# Patient Record
Sex: FEMALE | Race: WHITE | Employment: OTHER | URBAN - NONMETROPOLITAN AREA
[De-identification: names, ages, dates, MRNs, and addresses within clinical notes are randomized per-mention and may not be internally consistent; named-entity substitution may affect disease eponyms.]

---

## 2021-08-06 ENCOUNTER — HOSPITAL ENCOUNTER (OUTPATIENT)
Dept: PREADMISSION TESTING | Age: 76
Discharge: HOME | End: 2021-08-10
Payer: MEDICARE

## 2021-08-06 VITALS — BODY MASS INDEX: 24.45 KG/M2 | HEIGHT: 63 IN | WEIGHT: 138 LBS

## 2021-08-06 LAB
ABO/RH: NORMAL
ANTIBODY SCREEN: NORMAL
SARS-COV-2, PCR: NOT DETECTED

## 2021-08-06 PROCEDURE — 86901 BLOOD TYPING SEROLOGIC RH(D): CPT

## 2021-08-06 PROCEDURE — U0005 INFEC AGEN DETEC AMPLI PROBE: HCPCS

## 2021-08-06 PROCEDURE — U0003 INFECTIOUS AGENT DETECTION BY NUCLEIC ACID (DNA OR RNA); SEVERE ACUTE RESPIRATORY SYNDROME CORONAVIRUS 2 (SARS-COV-2) (CORONAVIRUS DISEASE [COVID-19]), AMPLIFIED PROBE TECHNIQUE, MAKING USE OF HIGH THROUGHPUT TECHNOLOGIES AS DESCRIBED BY CMS-2020-01-R: HCPCS

## 2021-08-06 PROCEDURE — 86900 BLOOD TYPING SEROLOGIC ABO: CPT

## 2021-08-06 PROCEDURE — 86850 RBC ANTIBODY SCREEN: CPT

## 2021-08-06 RX ORDER — MELOXICAM 15 MG/1
15 TABLET ORAL DAILY
COMMUNITY

## 2021-08-06 RX ORDER — CALCIUM CARBONATE 500(1250)
500 TABLET ORAL DAILY
COMMUNITY

## 2021-08-06 RX ORDER — CYANOCOBALAMIN (VITAMIN B-12) 1000 MCG
1 TABLET, EXTENDED RELEASE ORAL 2 TIMES DAILY WITH MEALS
COMMUNITY
End: 2021-08-06 | Stop reason: CLARIF

## 2021-08-06 RX ORDER — SIMVASTATIN 20 MG
20 TABLET ORAL NIGHTLY
COMMUNITY

## 2021-08-06 RX ORDER — VERAPAMIL HYDROCHLORIDE 240 MG/1
240 CAPSULE, EXTENDED RELEASE ORAL NIGHTLY
COMMUNITY

## 2021-08-06 RX ORDER — OMEGA-3S/DHA/EPA/FISH OIL/D3 300MG-1000
800 CAPSULE ORAL DAILY
COMMUNITY

## 2021-08-10 ENCOUNTER — ANESTHESIA EVENT (OUTPATIENT)
Dept: OPERATING ROOM | Age: 76
End: 2021-08-10
Payer: MEDICARE

## 2021-08-10 ENCOUNTER — ANESTHESIA (OUTPATIENT)
Dept: OPERATING ROOM | Age: 76
End: 2021-08-10
Payer: MEDICARE

## 2021-08-10 ENCOUNTER — APPOINTMENT (OUTPATIENT)
Dept: GENERAL RADIOLOGY | Age: 76
End: 2021-08-10
Attending: ORTHOPAEDIC SURGERY
Payer: MEDICARE

## 2021-08-10 ENCOUNTER — HOSPITAL ENCOUNTER (OUTPATIENT)
Age: 76
Setting detail: OBSERVATION
Discharge: HOME HEALTH | End: 2021-08-14
Attending: ORTHOPAEDIC SURGERY | Admitting: ORTHOPAEDIC SURGERY
Payer: MEDICARE

## 2021-08-10 VITALS — DIASTOLIC BLOOD PRESSURE: 41 MMHG | OXYGEN SATURATION: 100 % | SYSTOLIC BLOOD PRESSURE: 85 MMHG

## 2021-08-10 DIAGNOSIS — M16.12 PRIMARY OSTEOARTHRITIS OF LEFT HIP: Primary | ICD-10-CM

## 2021-08-10 PROCEDURE — 3700000001 HC ADD 15 MINUTES (ANESTHESIA): Performed by: ORTHOPAEDIC SURGERY

## 2021-08-10 PROCEDURE — G0378 HOSPITAL OBSERVATION PER HR: HCPCS

## 2021-08-10 PROCEDURE — 2580000003 HC RX 258: Performed by: ORTHOPAEDIC SURGERY

## 2021-08-10 PROCEDURE — 2500000003 HC RX 250 WO HCPCS: Performed by: NURSE ANESTHETIST, CERTIFIED REGISTERED

## 2021-08-10 PROCEDURE — 6370000000 HC RX 637 (ALT 250 FOR IP): Performed by: ORTHOPAEDIC SURGERY

## 2021-08-10 PROCEDURE — C1776 JOINT DEVICE (IMPLANTABLE): HCPCS | Performed by: ORTHOPAEDIC SURGERY

## 2021-08-10 PROCEDURE — 3600000005 HC SURGERY LEVEL 5 BASE: Performed by: ORTHOPAEDIC SURGERY

## 2021-08-10 PROCEDURE — 6360000002 HC RX W HCPCS: Performed by: ORTHOPAEDIC SURGERY

## 2021-08-10 PROCEDURE — 7100000000 HC PACU RECOVERY - FIRST 15 MIN: Performed by: ORTHOPAEDIC SURGERY

## 2021-08-10 PROCEDURE — 73502 X-RAY EXAM HIP UNI 2-3 VIEWS: CPT

## 2021-08-10 PROCEDURE — 3700000000 HC ANESTHESIA ATTENDED CARE: Performed by: ORTHOPAEDIC SURGERY

## 2021-08-10 PROCEDURE — 3600000015 HC SURGERY LEVEL 5 ADDTL 15MIN: Performed by: ORTHOPAEDIC SURGERY

## 2021-08-10 PROCEDURE — 6360000002 HC RX W HCPCS: Performed by: NURSE ANESTHETIST, CERTIFIED REGISTERED

## 2021-08-10 PROCEDURE — 2709999900 HC NON-CHARGEABLE SUPPLY: Performed by: ORTHOPAEDIC SURGERY

## 2021-08-10 PROCEDURE — 7100000001 HC PACU RECOVERY - ADDTL 15 MIN: Performed by: ORTHOPAEDIC SURGERY

## 2021-08-10 PROCEDURE — 2500000003 HC RX 250 WO HCPCS: Performed by: ORTHOPAEDIC SURGERY

## 2021-08-10 DEVICE — IMPLANTABLE DEVICE
Type: IMPLANTABLE DEVICE | Site: HIP | Status: FUNCTIONAL
Brand: LOGICAL G-SERIES

## 2021-08-10 DEVICE — LOGICAL XLPE NEUTRAL LINER SIZE 32/48-50MM
Type: IMPLANTABLE DEVICE | Site: HIP | Status: FUNCTIONAL
Brand: PAXEON LOGICAL

## 2021-08-10 DEVICE — IMPLANTABLE DEVICE
Type: IMPLANTABLE DEVICE | Site: HIP | Status: FUNCTIONAL
Brand: ORIGIN

## 2021-08-10 DEVICE — HEAD, FEMORAL, CERAMIC, BILOX DELTA, 32MM NEUTRAL
Type: IMPLANTABLE DEVICE | Site: HIP | Status: FUNCTIONAL
Brand: DJO SURGICAL

## 2021-08-10 RX ORDER — HYDRALAZINE HYDROCHLORIDE 20 MG/ML
5 INJECTION INTRAMUSCULAR; INTRAVENOUS EVERY 10 MIN PRN
Status: DISCONTINUED | OUTPATIENT
Start: 2021-08-10 | End: 2021-08-10 | Stop reason: HOSPADM

## 2021-08-10 RX ORDER — VERAPAMIL HYDROCHLORIDE 120 MG/1
240 CAPSULE, EXTENDED RELEASE ORAL NIGHTLY
Status: DISCONTINUED | OUTPATIENT
Start: 2021-08-10 | End: 2021-08-10 | Stop reason: SDUPTHER

## 2021-08-10 RX ORDER — MORPHINE SULFATE 4 MG/ML
2 INJECTION, SOLUTION INTRAMUSCULAR; INTRAVENOUS
Status: DISCONTINUED | OUTPATIENT
Start: 2021-08-10 | End: 2021-08-14 | Stop reason: HOSPADM

## 2021-08-10 RX ORDER — MORPHINE SULFATE 4 MG/ML
4 INJECTION, SOLUTION INTRAMUSCULAR; INTRAVENOUS
Status: DISCONTINUED | OUTPATIENT
Start: 2021-08-10 | End: 2021-08-14 | Stop reason: HOSPADM

## 2021-08-10 RX ORDER — ATORVASTATIN CALCIUM 10 MG/1
10 TABLET, FILM COATED ORAL NIGHTLY
Status: DISCONTINUED | OUTPATIENT
Start: 2021-08-10 | End: 2021-08-14 | Stop reason: HOSPADM

## 2021-08-10 RX ORDER — ONDANSETRON 2 MG/ML
INJECTION INTRAMUSCULAR; INTRAVENOUS PRN
Status: DISCONTINUED | OUTPATIENT
Start: 2021-08-10 | End: 2021-08-10 | Stop reason: SDUPTHER

## 2021-08-10 RX ORDER — PROMETHAZINE HYDROCHLORIDE 25 MG/ML
6.25 INJECTION, SOLUTION INTRAMUSCULAR; INTRAVENOUS
Status: DISCONTINUED | OUTPATIENT
Start: 2021-08-10 | End: 2021-08-10 | Stop reason: HOSPADM

## 2021-08-10 RX ORDER — CELECOXIB 100 MG/1
100 CAPSULE ORAL ONCE
Status: COMPLETED | OUTPATIENT
Start: 2021-08-10 | End: 2021-08-10

## 2021-08-10 RX ORDER — METOCLOPRAMIDE HYDROCHLORIDE 5 MG/ML
10 INJECTION INTRAMUSCULAR; INTRAVENOUS
Status: DISCONTINUED | OUTPATIENT
Start: 2021-08-10 | End: 2021-08-10 | Stop reason: HOSPADM

## 2021-08-10 RX ORDER — SODIUM CHLORIDE, SODIUM LACTATE, POTASSIUM CHLORIDE, CALCIUM CHLORIDE 600; 310; 30; 20 MG/100ML; MG/100ML; MG/100ML; MG/100ML
INJECTION, SOLUTION INTRAVENOUS CONTINUOUS
Status: DISCONTINUED | OUTPATIENT
Start: 2021-08-10 | End: 2021-08-10

## 2021-08-10 RX ORDER — ENALAPRILAT 2.5 MG/2ML
1.25 INJECTION INTRAVENOUS
Status: DISCONTINUED | OUTPATIENT
Start: 2021-08-10 | End: 2021-08-10 | Stop reason: HOSPADM

## 2021-08-10 RX ORDER — ROPIVACAINE HYDROCHLORIDE 2 MG/ML
INJECTION, SOLUTION EPIDURAL; INFILTRATION; PERINEURAL CONTINUOUS PRN
Status: COMPLETED | OUTPATIENT
Start: 2021-08-10 | End: 2021-08-10

## 2021-08-10 RX ORDER — SODIUM CHLORIDE 0.9 % (FLUSH) 0.9 %
10 SYRINGE (ML) INJECTION EVERY 12 HOURS SCHEDULED
Status: DISCONTINUED | OUTPATIENT
Start: 2021-08-10 | End: 2021-08-14 | Stop reason: HOSPADM

## 2021-08-10 RX ORDER — SENNA AND DOCUSATE SODIUM 50; 8.6 MG/1; MG/1
1 TABLET, FILM COATED ORAL 2 TIMES DAILY
Status: DISCONTINUED | OUTPATIENT
Start: 2021-08-10 | End: 2021-08-14 | Stop reason: HOSPADM

## 2021-08-10 RX ORDER — TRANEXAMIC ACID 650 1/1
1950 TABLET ORAL
Status: COMPLETED | OUTPATIENT
Start: 2021-08-10 | End: 2021-08-10

## 2021-08-10 RX ORDER — OXYCODONE HYDROCHLORIDE 5 MG/1
10 TABLET ORAL EVERY 4 HOURS PRN
Status: DISCONTINUED | OUTPATIENT
Start: 2021-08-10 | End: 2021-08-14 | Stop reason: HOSPADM

## 2021-08-10 RX ORDER — BIOTIN 10000 MCG
1 CAPSULE ORAL DAILY
Status: DISCONTINUED | OUTPATIENT
Start: 2021-08-10 | End: 2021-08-10 | Stop reason: CLARIF

## 2021-08-10 RX ORDER — HYDROMORPHONE HYDROCHLORIDE 1 MG/ML
0.5 INJECTION, SOLUTION INTRAMUSCULAR; INTRAVENOUS; SUBCUTANEOUS EVERY 5 MIN PRN
Status: DISCONTINUED | OUTPATIENT
Start: 2021-08-10 | End: 2021-08-10 | Stop reason: HOSPADM

## 2021-08-10 RX ORDER — MORPHINE SULFATE 4 MG/ML
4 INJECTION, SOLUTION INTRAMUSCULAR; INTRAVENOUS EVERY 5 MIN PRN
Status: DISCONTINUED | OUTPATIENT
Start: 2021-08-10 | End: 2021-08-10 | Stop reason: HOSPADM

## 2021-08-10 RX ORDER — PROPOFOL 10 MG/ML
INJECTION, EMULSION INTRAVENOUS PRN
Status: DISCONTINUED | OUTPATIENT
Start: 2021-08-10 | End: 2021-08-10 | Stop reason: SDUPTHER

## 2021-08-10 RX ORDER — ACETAMINOPHEN 325 MG/1
650 TABLET ORAL EVERY 6 HOURS
Status: DISCONTINUED | OUTPATIENT
Start: 2021-08-10 | End: 2021-08-14 | Stop reason: HOSPADM

## 2021-08-10 RX ORDER — LABETALOL HYDROCHLORIDE 5 MG/ML
5 INJECTION, SOLUTION INTRAVENOUS EVERY 10 MIN PRN
Status: DISCONTINUED | OUTPATIENT
Start: 2021-08-10 | End: 2021-08-10 | Stop reason: HOSPADM

## 2021-08-10 RX ORDER — DIPHENHYDRAMINE HYDROCHLORIDE 50 MG/ML
12.5 INJECTION INTRAMUSCULAR; INTRAVENOUS
Status: DISCONTINUED | OUTPATIENT
Start: 2021-08-10 | End: 2021-08-10 | Stop reason: HOSPADM

## 2021-08-10 RX ORDER — OMEGA-3S/DHA/EPA/FISH OIL/D3 300MG-1000
800 CAPSULE ORAL DAILY
Status: DISCONTINUED | OUTPATIENT
Start: 2021-08-10 | End: 2021-08-14 | Stop reason: HOSPADM

## 2021-08-10 RX ORDER — ACETAMINOPHEN 500 MG
1000 TABLET ORAL ONCE
Status: COMPLETED | OUTPATIENT
Start: 2021-08-10 | End: 2021-08-10

## 2021-08-10 RX ORDER — MELOXICAM 7.5 MG/1
15 TABLET ORAL DAILY
Status: DISCONTINUED | OUTPATIENT
Start: 2021-08-10 | End: 2021-08-14 | Stop reason: HOSPADM

## 2021-08-10 RX ORDER — DEXAMETHASONE SODIUM PHOSPHATE 10 MG/ML
INJECTION, SOLUTION INTRAMUSCULAR; INTRAVENOUS PRN
Status: DISCONTINUED | OUTPATIENT
Start: 2021-08-10 | End: 2021-08-10 | Stop reason: SDUPTHER

## 2021-08-10 RX ORDER — MORPHINE SULFATE 4 MG/ML
2 INJECTION, SOLUTION INTRAMUSCULAR; INTRAVENOUS EVERY 5 MIN PRN
Status: DISCONTINUED | OUTPATIENT
Start: 2021-08-10 | End: 2021-08-10 | Stop reason: HOSPADM

## 2021-08-10 RX ORDER — VERAPAMIL HYDROCHLORIDE 240 MG/1
240 TABLET, FILM COATED, EXTENDED RELEASE ORAL NIGHTLY
Status: DISCONTINUED | OUTPATIENT
Start: 2021-08-10 | End: 2021-08-14 | Stop reason: HOSPADM

## 2021-08-10 RX ORDER — EPHEDRINE SULFATE 50 MG/ML
INJECTION, SOLUTION INTRAVENOUS PRN
Status: DISCONTINUED | OUTPATIENT
Start: 2021-08-10 | End: 2021-08-10 | Stop reason: SDUPTHER

## 2021-08-10 RX ORDER — OXYCODONE HYDROCHLORIDE 5 MG/1
5 TABLET ORAL EVERY 4 HOURS PRN
Status: DISCONTINUED | OUTPATIENT
Start: 2021-08-10 | End: 2021-08-14 | Stop reason: HOSPADM

## 2021-08-10 RX ORDER — OXYCODONE HCL 10 MG/1
10 TABLET, FILM COATED, EXTENDED RELEASE ORAL
Status: COMPLETED | OUTPATIENT
Start: 2021-08-10 | End: 2021-08-10

## 2021-08-10 RX ORDER — SODIUM CHLORIDE 0.9 % (FLUSH) 0.9 %
10 SYRINGE (ML) INJECTION PRN
Status: DISCONTINUED | OUTPATIENT
Start: 2021-08-10 | End: 2021-08-14 | Stop reason: HOSPADM

## 2021-08-10 RX ORDER — MEPERIDINE HYDROCHLORIDE 25 MG/ML
12.5 INJECTION INTRAMUSCULAR; INTRAVENOUS; SUBCUTANEOUS EVERY 5 MIN PRN
Status: DISCONTINUED | OUTPATIENT
Start: 2021-08-10 | End: 2021-08-10 | Stop reason: HOSPADM

## 2021-08-10 RX ORDER — ASPIRIN 81 MG/1
81 TABLET ORAL DAILY
Status: ON HOLD | COMMUNITY
End: 2021-08-13 | Stop reason: HOSPADM

## 2021-08-10 RX ORDER — BUPIVACAINE HYDROCHLORIDE 7.5 MG/ML
INJECTION, SOLUTION INTRASPINAL PRN
Status: DISCONTINUED | OUTPATIENT
Start: 2021-08-10 | End: 2021-08-10 | Stop reason: SDUPTHER

## 2021-08-10 RX ORDER — HYDROMORPHONE HYDROCHLORIDE 1 MG/ML
0.25 INJECTION, SOLUTION INTRAMUSCULAR; INTRAVENOUS; SUBCUTANEOUS EVERY 5 MIN PRN
Status: DISCONTINUED | OUTPATIENT
Start: 2021-08-10 | End: 2021-08-10 | Stop reason: HOSPADM

## 2021-08-10 RX ORDER — SODIUM CHLORIDE 9 MG/ML
25 INJECTION, SOLUTION INTRAVENOUS PRN
Status: DISCONTINUED | OUTPATIENT
Start: 2021-08-10 | End: 2021-08-14 | Stop reason: HOSPADM

## 2021-08-10 RX ORDER — BIOTIN 10000 MCG
1 CAPSULE ORAL DAILY
COMMUNITY

## 2021-08-10 RX ADMIN — VERAPAMIL HYDROCHLORIDE 240 MG: 240 TABLET, FILM COATED, EXTENDED RELEASE ORAL at 21:07

## 2021-08-10 RX ADMIN — MELOXICAM 15 MG: 7.5 TABLET ORAL at 17:05

## 2021-08-10 RX ADMIN — PHENYLEPHRINE HYDROCHLORIDE 80 MCG: 10 INJECTION INTRAVENOUS at 13:50

## 2021-08-10 RX ADMIN — MORPHINE SULFATE 4 MG: 4 INJECTION, SOLUTION INTRAMUSCULAR; INTRAVENOUS at 21:08

## 2021-08-10 RX ADMIN — PROPOFOL 20 MG: 10 INJECTION, EMULSION INTRAVENOUS at 13:02

## 2021-08-10 RX ADMIN — DEXAMETHASONE SODIUM PHOSPHATE 10 MG: 10 INJECTION, SOLUTION INTRAMUSCULAR; INTRAVENOUS at 13:11

## 2021-08-10 RX ADMIN — OXYCODONE 10 MG: 5 TABLET ORAL at 19:36

## 2021-08-10 RX ADMIN — CELECOXIB 100 MG: 100 CAPSULE ORAL at 11:04

## 2021-08-10 RX ADMIN — EPHEDRINE SULFATE 10 MG: 50 INJECTION INTRAMUSCULAR; INTRAVENOUS; SUBCUTANEOUS at 13:09

## 2021-08-10 RX ADMIN — PHENYLEPHRINE HYDROCHLORIDE 80 MCG: 10 INJECTION INTRAVENOUS at 13:41

## 2021-08-10 RX ADMIN — SODIUM CHLORIDE, SODIUM LACTATE, POTASSIUM CHLORIDE, AND CALCIUM CHLORIDE: 600; 310; 30; 20 INJECTION, SOLUTION INTRAVENOUS at 13:26

## 2021-08-10 RX ADMIN — ASPIRIN 325 MG: 325 TABLET, COATED ORAL at 21:07

## 2021-08-10 RX ADMIN — CHOLECALCIFEROL TAB 10 MCG (400 UNIT) 800 UNITS: 10 TAB at 17:05

## 2021-08-10 RX ADMIN — PHENYLEPHRINE HYDROCHLORIDE 80 MCG: 10 INJECTION INTRAVENOUS at 13:31

## 2021-08-10 RX ADMIN — Medication 2000 MG: at 21:07

## 2021-08-10 RX ADMIN — PROPOFOL 20 MG: 10 INJECTION, EMULSION INTRAVENOUS at 12:50

## 2021-08-10 RX ADMIN — MORPHINE SULFATE 4 MG: 4 INJECTION, SOLUTION INTRAMUSCULAR; INTRAVENOUS at 23:59

## 2021-08-10 RX ADMIN — ATORVASTATIN CALCIUM 10 MG: 10 TABLET, FILM COATED ORAL at 21:08

## 2021-08-10 RX ADMIN — ACETAMINOPHEN 650 MG: 325 TABLET ORAL at 17:05

## 2021-08-10 RX ADMIN — PHENYLEPHRINE HYDROCHLORIDE 80 MCG: 10 INJECTION INTRAVENOUS at 14:03

## 2021-08-10 RX ADMIN — ONDANSETRON HYDROCHLORIDE 4 MG: 2 INJECTION, SOLUTION INTRAMUSCULAR; INTRAVENOUS at 14:10

## 2021-08-10 RX ADMIN — PROPOFOL 120 MCG/KG/MIN: 10 INJECTION, EMULSION INTRAVENOUS at 12:57

## 2021-08-10 RX ADMIN — Medication 2000 MG: at 13:01

## 2021-08-10 RX ADMIN — ACETAMINOPHEN 1000 MG: 500 TABLET ORAL at 11:04

## 2021-08-10 RX ADMIN — OXYCODONE HYDROCHLORIDE 10 MG: 10 TABLET, FILM COATED, EXTENDED RELEASE ORAL at 11:04

## 2021-08-10 RX ADMIN — TRANEXAMIC ACID 1950 MG: 650 TABLET ORAL at 11:04

## 2021-08-10 RX ADMIN — ACETAMINOPHEN 650 MG: 325 TABLET ORAL at 21:07

## 2021-08-10 RX ADMIN — EPHEDRINE SULFATE 10 MG: 50 INJECTION INTRAMUSCULAR; INTRAVENOUS; SUBCUTANEOUS at 13:12

## 2021-08-10 RX ADMIN — SODIUM CHLORIDE, PRESERVATIVE FREE 10 ML: 5 INJECTION INTRAVENOUS at 21:07

## 2021-08-10 RX ADMIN — BUPIVACAINE HYDROCHLORIDE IN DEXTROSE 2 ML: 7.5 INJECTION, SOLUTION SUBARACHNOID at 12:54

## 2021-08-10 RX ADMIN — SODIUM CHLORIDE, SODIUM LACTATE, POTASSIUM CHLORIDE, AND CALCIUM CHLORIDE: 600; 310; 30; 20 INJECTION, SOLUTION INTRAVENOUS at 11:07

## 2021-08-10 RX ADMIN — DOCUSATE SODIUM 50 MG AND SENNOSIDES 8.6 MG 1 TABLET: 8.6; 5 TABLET, FILM COATED ORAL at 21:07

## 2021-08-10 ASSESSMENT — PAIN SCALES - GENERAL
PAINLEVEL_OUTOF10: 4
PAINLEVEL_OUTOF10: 5
PAINLEVEL_OUTOF10: 9
PAINLEVEL_OUTOF10: 7
PAINLEVEL_OUTOF10: 0
PAINLEVEL_OUTOF10: 0
PAINLEVEL_OUTOF10: 8
PAINLEVEL_OUTOF10: 5

## 2021-08-10 ASSESSMENT — PAIN DESCRIPTION - ONSET
ONSET: ON-GOING
ONSET: ON-GOING

## 2021-08-10 ASSESSMENT — PAIN DESCRIPTION - PAIN TYPE
TYPE: SURGICAL PAIN

## 2021-08-10 ASSESSMENT — PAIN DESCRIPTION - ORIENTATION
ORIENTATION: LEFT

## 2021-08-10 ASSESSMENT — PAIN DESCRIPTION - PROGRESSION
CLINICAL_PROGRESSION: NOT CHANGED
CLINICAL_PROGRESSION: GRADUALLY WORSENING

## 2021-08-10 ASSESSMENT — PAIN DESCRIPTION - LOCATION
LOCATION: HIP

## 2021-08-10 ASSESSMENT — PAIN DESCRIPTION - DIRECTION
RADIATING_TOWARDS: NO
RADIATING_TOWARDS: NO

## 2021-08-10 ASSESSMENT — PAIN DESCRIPTION - FREQUENCY
FREQUENCY: INTERMITTENT
FREQUENCY: CONTINUOUS

## 2021-08-10 ASSESSMENT — PAIN DESCRIPTION - DESCRIPTORS
DESCRIPTORS: SHARP
DESCRIPTORS: SHARP

## 2021-08-10 ASSESSMENT — PAIN - FUNCTIONAL ASSESSMENT
PAIN_FUNCTIONAL_ASSESSMENT: PREVENTS OR INTERFERES SOME ACTIVE ACTIVITIES AND ADLS
PAIN_FUNCTIONAL_ASSESSMENT: PREVENTS OR INTERFERES SOME ACTIVE ACTIVITIES AND ADLS

## 2021-08-10 ASSESSMENT — LIFESTYLE VARIABLES: SMOKING_STATUS: 0

## 2021-08-10 NOTE — ANESTHESIA POSTPROCEDURE EVALUATION
Department of Anesthesiology  Postprocedure Note    Patient: Obdulia Bowser  MRN: [id number]  YOB: 1945  Date of evaluation: 8/10/2021  Time:  2:25 PM     Procedure Summary     Date: 08/10/21 Room / Location: [address]    Anesthesia Start: 4985 Anesthesia Stop: 6235    Procedure: LEFT TOTAL HIP ARTHROPLASTY ANTERIOR APPROACH (Left Hip) Diagnosis: (F47.22)    Surgeons: Law García MD Responsible Provider: CORY Workman CRNA    Anesthesia Type: spinal ASA Status: 2          Anesthesia Type: spinal    Coral Phase I: Coral Score: 10    Coral Phase II:      Last vitals: Reviewed and per EMR flowsheets.        Anesthesia Post Evaluation    Patient location during evaluation: PACU  Patient participation: complete - patient participated  Level of consciousness: responsive to verbal stimuli and sleepy but conscious  Pain score: 0  Airway patency: patent  Nausea & Vomiting: no vomiting and no nausea  Complications: no  Cardiovascular status: hemodynamically stable  Respiratory status: room air and acceptable  Hydration status: stable  Comments: T 98

## 2021-08-10 NOTE — PROGRESS NOTES
PHARMACY NOTE  Irvine Litten was ordered Biotin. Per the [address], this medication is non-formulary and not stocked by pharmacy. It has been discontinued. The medication can be reordered at discharge.      Electronically signed by Jen Rivas, [address] [place] [address] on 8/10/2021 at 3:41 PM

## 2021-08-10 NOTE — OP NOTE
TOTAL HIP ARTHROPLASTY OPERATIVE NOTE    NAME OF SURGEON / : Jose E Contreras MD  PATIENT:   Rad Cardenas  Date: 8/10/2021        Time: 2:07 PM   Referring Physician: ________________________    PREOP DIAGNOSIS:  left hip  Primary osteoarthritis   POSTOP DIAGNOSIS:  Same     PROCEDURE:    Left    Hip arthroplasty (88443)     IMPLANTS:   Implant Name Type Inv. Item Serial No.  Lot No. LRB No. Used Action   SHELL ACET 3 HOLE 48-B HIP LOGICAL G-SERIES  SHELL ACET 3 HOLE 48-B HIP LOGICAL G-SERIES  [employer] [id number] Left 1 Implanted   STEM FEM SZ 11 OFFSET MOD STD CLLRD ORIGIN  STEM FEM SZ 11 OFFSET MOD STD CLLRD ORIGIN  [employer] [id number] Left 1 Implanted   LINER ACET NEUT 32X48-50 MM HIP XLPE LOGICAL  LINER ACET NEUT 32X48-50 MM HIP XLPE LOGICAL  [employer]WD [id number] Left 1 Implanted   HEAD FEM NEUT 32 MM HIP OFFSET FOR FMP SYS BIOLOX DELT CERM  HEAD FEM NEUT 32 MM HIP OFFSET FOR FMP SYS BIOLOX DELT [facility] Files [id number] Left 1 Implanted       FINDINGS: None  ASSISTANT:  Meryle Kuba, certified first assistant. Helped with draping, exposure, retraction, essential steps of the procedure, and with wound closure. ANESTHESIA:  General  EBL:  500 mL  FLUIDS: See anesthesia record  BLOOD PRODUCTS:  None  COMPLICATIONS:  Small medial calcar fracture during broaching. Nondisplaced. Chose not to use cable since final stem stable with a collar. Will toe touch wb x 3 weeks. SPECIMEN:  None        INDICATIONS:  Patient presents for the above procedure having failed conservative treatment. Patient consents to the procedure above understanding the risks of bleeding, infection, anesthesia, nerve injury, stiffness, and blood clots. Procedure in Detail:    The patient was brought into the operating room, general anesthesia given, and transferred to the HANA table.   The operative extremity was placed in light traction across a padded perineal post. An antibiotic was given IV. Parmjit Ra The extremity was prepped with chlorhexidine and alcohol and draped sterilely. Ioband barriers were used. An anterior approach was made to the hip. Careful dissection was carried down to fascia which was longitudinally incised. The tensor muscle was elevated off the medial fascia and a cobra retractor placed around the lateral femoral neck. Aurelia retractors were used distally between the Sartorious and tensor to expose the vastus aponeurosis. This was released with the bovie to expose the lateral circumflex vessels. These were coagulated and divided with the bovie. A herman elevator was used to lift the rectus off the capsule and a cobra retractor placed around the medial femoral neck. A bent roseanna retractor was placed over the superior acetabulum. Traction was applied and the capsule was incised by beginning at the superior acetabulum and extending distally to the intertrochanteric line and then dividing medially and laterally. The capsular flaps were marked with 0 vicryl sutures. The cobra retractors were placed deep to capsule around the neck. The neck was cut with the saw beginning laterally at the neck-trochanter junction and completed medially 5 mm proximal to the intertrochanteric line. A second saw cut was made parallel to the first 10 mm proximal to the first cut. The ring of neck bone was removed with a kocher clamp and the head removed with a corkscrew. Traction was released and the leg was externally rotated 120 degrees. A roseanna retractor was placed over the lesser trochanter and the medial hip capsule was released of the inferior femoral neck with a bovie and herman elevator. Gentle traction was applied and cobra retractors were placed around the acetabulum. The inferior capsule was released and the labrum and foveal were excised.   The socket was reamed with a reamer 2 sizes smaller than the head diameter and continued with increasing reamer size running 3-0 vicryl and prineo. A sterile dressing was placed. The patient was awakened, extubated and transferred to recovery in stable condition. ITERATIONS   Neck Length (mm) Offset: Other Stable Ant?  Leg Length difference  (mm) Stable Post?                                                           Electronically signed by Colin Wright MD on 8/10/2021 at 2:07 PM

## 2021-08-10 NOTE — ANESTHESIA PRE PROCEDURE
Department of Anesthesiology  Preprocedure Note       Name:  Sil Robles   Age:  77 y.o.  :  1945                                          MRN:  [id number]         Date:  8/10/2021      Surgeon: Patrick Forde):  Jennifer Ruelas MD    Procedure: Procedure(s):  LEFT TOTAL HIP ARTHROPLASTY ANTERIOR APPROACH    Medications prior to admission:   Prior to Admission medications    Medication Sig Start Date End Date Taking?  Authorizing Provider   aspirin 81 MG EC tablet Take 81 mg by mouth daily   Yes Historical Provider, MD   Biotin 10 MG CAPS Take 1 tablet by mouth daily   Yes Historical Provider, MD   estrogens, conjugated, (PREMARIN) 0.3 MG tablet Take 0.3 mg by mouth daily   Yes Historical Provider, MD   verapamil (VERELAN) 240 MG extended release capsule Take 240 mg by mouth nightly   Yes Historical Provider, MD   simvastatin (ZOCOR) 20 MG tablet Take 20 mg by mouth nightly   Yes Historical Provider, MD   calcium carbonate (OSCAL) 500 MG TABS tablet Take 500 mg by mouth daily   Yes Historical Provider, MD   vitamin D3 (CHOLECALCIFEROL) 10 MCG (400 UNIT) TABS tablet Take 800 Units by mouth daily   Yes Historical Provider, MD   Pyridoxine HCl (VITAMIN B6 PO) Take 10 mg by mouth daily   Yes Historical Provider, MD   Magnesium Hydroxide (MAGNESIA PO) Take 80 mg by mouth daily   Yes Historical Provider, MD   BORON PO Take 1 mg by mouth daily   Yes Historical Provider, MD   meloxicam (MOBIC) 15 MG tablet Take 15 mg by mouth daily   Yes Historical Provider, MD       Current medications:    Current Facility-Administered Medications   Medication Dose Route Frequency Provider Last Rate Last Admin    oxyCODONE (OXYCONTIN) extended release tablet 10 mg  10 mg Oral 60 Min Pre-Op Jennifer Ruelas MD        acetaminophen (TYLENOL) tablet 1,000 mg  1,000 mg Oral Once Jennifer Ruelas MD        celecoxib (CELEBREX) capsule 100 mg  100 mg Oral Once Jennifer Ruelas MD        tranexamic acid (LYSTEDA) tablet 1,950 mg  1,950 mg Oral On Call to OR Pablo Ramos MD        lactated ringers infusion   Intravenous Continuous Pablo Ramos MD        ceFAZolin (ANCEF) 2000 mg in 0.9% sodium chloride 50 mL IVPB  2,000 mg Intravenous On Call to [address], MD           Allergies:  No Known Allergies    Problem List:  There is no problem list on file for this patient. Past Medical History:        Diagnosis Date    Arthritis     Hyperlipidemia     Hypertension        Past Surgical History:        Procedure Laterality Date    APPENDECTOMY      HYSTERECTOMY         Social History:    Social History     Tobacco Use    Smoking status: Never Smoker    Smokeless tobacco: Never Used   Substance Use Topics    Alcohol use: Yes     Comment: occasional                                Counseling given: Not Answered      Vital Signs (Current):   Vitals:    08/10/21 1049   BP: (!) 147/61   Pulse: 79   Resp: 16   Temp: 98.2 °F (36.8 °C)   TempSrc: Temporal   SpO2: 96%                                              BP Readings from Last 3 Encounters:   08/10/21 (!) 147/61       NPO Status: Time of last liquid consumption: 2300                        Time of last solid consumption: 2300                        Date of last liquid consumption: 08/09/21                        Date of last solid food consumption: 08/09/21    BMI:   Wt Readings from Last 3 Encounters:   08/06/21 138 lb (62.6 kg)     There is no height or weight on file to calculate BMI.    CBC: No results found for: WBC, RBC, HGB, HCT, MCV, RDW, PLT    CMP: No results found for: NA, K, CL, CO2, BUN, CREATININE, GFRAA, AGRATIO, LABGLOM, GLUCOSE, PROT, CALCIUM, BILITOT, ALKPHOS, AST, ALT    POC Tests: No results for input(s): POCGLU, POCNA, POCK, POCCL, POCBUN, POCHEMO, POCHCT in the last 72 hours.     Coags: No results found for: PROTIME, INR, APTT    HCG (If Applicable): No results found for: PREGTESTUR, PREGSERUM, HCG, HCGQUANT     ABGs: No results found for: PHART, PO2ART, TJL0NWX, ZPM2HFP, BEART, P7UVAOAK     Type & Screen (If Applicable):  No results found for: LABABO, LABRH    Drug/Infectious Status (If Applicable):  No results found for: HIV, HEPCAB    COVID-19 Screening (If Applicable):   Lab Results   Component Value Date    COVID19 Not Detected 08/06/2021           Anesthesia Evaluation  Patient summary reviewed and Nursing notes reviewed no history of anesthetic complications:   Airway: Mallampati: II  TM distance: >3 FB   Neck ROM: full  Mouth opening: > = 3 FB Dental: normal exam         Pulmonary:normal exam        (-) not a current smoker                           Cardiovascular:    (+) hypertension:, hyperlipidemia      ECG reviewed               Beta Blocker:  Not on Beta Blocker         Neuro/Psych:      (-) seizures and CVA           GI/Hepatic/Renal:        (-) GERD, liver disease and no renal disease       Endo/Other:        (-) diabetes mellitus               Abdominal:             Vascular: negative vascular ROS. Other Findings:             Anesthesia Plan      spinal     ASA 2     (Pt is hoping to go home)      MIPS: Postoperative opioids intended and Prophylactic antiemetics administered. Anesthetic plan and risks discussed with patient and spouse. Plan discussed with CRNA.                 Marilou Ware MD   8/10/2021

## 2021-08-11 LAB
ANION GAP SERPL CALCULATED.3IONS-SCNC: 8 MMOL/L (ref 7–19)
BUN BLDV-MCNC: 12 MG/DL (ref 8–23)
CALCIUM SERPL-MCNC: 8 MG/DL (ref 8.8–10.2)
CHLORIDE BLD-SCNC: 101 MMOL/L (ref 98–111)
CO2: 28 MMOL/L (ref 22–29)
CREAT SERPL-MCNC: 0.7 MG/DL (ref 0.5–0.9)
GFR AFRICAN AMERICAN: >59
GFR NON-AFRICAN AMERICAN: >60
GLUCOSE BLD-MCNC: 128 MG/DL (ref 74–109)
HCT VFR BLD CALC: 26.3 % (ref 37–47)
HEMOGLOBIN: 8.6 G/DL (ref 12–16)
POTASSIUM REFLEX MAGNESIUM: 3.6 MMOL/L (ref 3.5–5)
SODIUM BLD-SCNC: 137 MMOL/L (ref 136–145)

## 2021-08-11 PROCEDURE — G0378 HOSPITAL OBSERVATION PER HR: HCPCS

## 2021-08-11 PROCEDURE — 97530 THERAPEUTIC ACTIVITIES: CPT

## 2021-08-11 PROCEDURE — 6360000002 HC RX W HCPCS: Performed by: ORTHOPAEDIC SURGERY

## 2021-08-11 PROCEDURE — 2580000003 HC RX 258: Performed by: ORTHOPAEDIC SURGERY

## 2021-08-11 PROCEDURE — 36415 COLL VENOUS BLD VENIPUNCTURE: CPT

## 2021-08-11 PROCEDURE — 51798 US URINE CAPACITY MEASURE: CPT

## 2021-08-11 PROCEDURE — 85018 HEMOGLOBIN: CPT

## 2021-08-11 PROCEDURE — 97165 OT EVAL LOW COMPLEX 30 MIN: CPT

## 2021-08-11 PROCEDURE — 80048 BASIC METABOLIC PNL TOTAL CA: CPT

## 2021-08-11 PROCEDURE — 97161 PT EVAL LOW COMPLEX 20 MIN: CPT

## 2021-08-11 PROCEDURE — 6370000000 HC RX 637 (ALT 250 FOR IP): Performed by: ORTHOPAEDIC SURGERY

## 2021-08-11 PROCEDURE — 85014 HEMATOCRIT: CPT

## 2021-08-11 RX ADMIN — OXYCODONE 10 MG: 5 TABLET ORAL at 03:30

## 2021-08-11 RX ADMIN — ACETAMINOPHEN 650 MG: 325 TABLET ORAL at 17:56

## 2021-08-11 RX ADMIN — ACETAMINOPHEN 650 MG: 325 TABLET ORAL at 08:38

## 2021-08-11 RX ADMIN — DOCUSATE SODIUM 50 MG AND SENNOSIDES 8.6 MG 1 TABLET: 8.6; 5 TABLET, FILM COATED ORAL at 21:06

## 2021-08-11 RX ADMIN — ASPIRIN 325 MG: 325 TABLET, COATED ORAL at 21:06

## 2021-08-11 RX ADMIN — ATORVASTATIN CALCIUM 10 MG: 10 TABLET, FILM COATED ORAL at 21:06

## 2021-08-11 RX ADMIN — CHOLECALCIFEROL TAB 10 MCG (400 UNIT) 800 UNITS: 10 TAB at 08:37

## 2021-08-11 RX ADMIN — OXYCODONE 10 MG: 5 TABLET ORAL at 18:25

## 2021-08-11 RX ADMIN — Medication 2000 MG: at 03:30

## 2021-08-11 RX ADMIN — VERAPAMIL HYDROCHLORIDE 240 MG: 240 TABLET, FILM COATED, EXTENDED RELEASE ORAL at 21:06

## 2021-08-11 RX ADMIN — OXYCODONE 10 MG: 5 TABLET ORAL at 07:48

## 2021-08-11 RX ADMIN — ACETAMINOPHEN 650 MG: 325 TABLET ORAL at 21:06

## 2021-08-11 RX ADMIN — DOCUSATE SODIUM 50 MG AND SENNOSIDES 8.6 MG 1 TABLET: 8.6; 5 TABLET, FILM COATED ORAL at 08:38

## 2021-08-11 RX ADMIN — ASPIRIN 325 MG: 325 TABLET, COATED ORAL at 08:38

## 2021-08-11 RX ADMIN — MELOXICAM 15 MG: 7.5 TABLET ORAL at 08:38

## 2021-08-11 RX ADMIN — ACETAMINOPHEN 650 MG: 325 TABLET ORAL at 03:30

## 2021-08-11 RX ADMIN — SODIUM CHLORIDE, PRESERVATIVE FREE 10 ML: 5 INJECTION INTRAVENOUS at 21:00

## 2021-08-11 ASSESSMENT — PAIN DESCRIPTION - LOCATION
LOCATION: HIP

## 2021-08-11 ASSESSMENT — PAIN DESCRIPTION - PAIN TYPE
TYPE: SURGICAL PAIN

## 2021-08-11 ASSESSMENT — PAIN DESCRIPTION - ONSET
ONSET: ON-GOING
ONSET: ON-GOING

## 2021-08-11 ASSESSMENT — PAIN SCALES - GENERAL
PAINLEVEL_OUTOF10: 3
PAINLEVEL_OUTOF10: 7
PAINLEVEL_OUTOF10: 6
PAINLEVEL_OUTOF10: 5
PAINLEVEL_OUTOF10: 6
PAINLEVEL_OUTOF10: 3
PAINLEVEL_OUTOF10: 4
PAINLEVEL_OUTOF10: 4

## 2021-08-11 ASSESSMENT — PAIN - FUNCTIONAL ASSESSMENT
PAIN_FUNCTIONAL_ASSESSMENT: PREVENTS OR INTERFERES SOME ACTIVE ACTIVITIES AND ADLS

## 2021-08-11 ASSESSMENT — PAIN DESCRIPTION - ORIENTATION
ORIENTATION: LEFT

## 2021-08-11 ASSESSMENT — PAIN DESCRIPTION - PROGRESSION
CLINICAL_PROGRESSION: NOT CHANGED

## 2021-08-11 ASSESSMENT — PAIN DESCRIPTION - DESCRIPTORS
DESCRIPTORS: DISCOMFORT;SORE
DESCRIPTORS: DISCOMFORT;SORE
DESCRIPTORS: SHARP

## 2021-08-11 ASSESSMENT — PAIN DESCRIPTION - FREQUENCY
FREQUENCY: INTERMITTENT

## 2021-08-11 NOTE — CARE COORDINATION
SW went over choice list with Pt of facilities in the area. Pt has requested to be listed with [facility] (formerly [facility]  SW has notified the facility of the referral. Awaiting approval/denial.  [facility]   T   F  Electronically signed by Tim Simms on 8/11/2021 at 3:20 PM

## 2021-08-11 NOTE — PROGRESS NOTES
Subjective:     Post-Operative Day: 1 No complaints. A lot of pain. We have stairs at home    Objective:     Patient Vitals for the past 24 hrs:   BP Temp Temp src Pulse Resp SpO2 Height Weight   08/11/21 0354 (!) 135/55 97.9 °F (36.6 °C) Temporal 110 16 90 % -- --   08/10/21 2358 (!) 148/58 98.1 °F (36.7 °C) Temporal 101 19 97 % -- --   08/10/21 1912 (!) 118/50 96.9 °F (36.1 °C) Temporal 74 16 96 % -- --   08/10/21 1610 (!) 131/58 97.4 °F (36.3 °C) Temporal 67 14 97 % -- --   08/10/21 1541 (!) 125/51 96.6 °F (35.9 °C) Temporal 66 16 96 % 5' 3\" (1.6 m) 138 lb (62.6 kg)   08/10/21 1537 -- -- -- 73 -- -- -- --   08/10/21 1522 (!) 114/53 -- -- 59 16 94 % -- --   08/10/21 1517 (!) 118/50 -- -- 60 16 92 % -- --   08/10/21 1515 -- -- -- 58 -- -- -- --   08/10/21 1512 (!) 119/53 -- -- 60 15 90 % -- --   08/10/21 1507 (!) 122/54 98.5 °F (36.9 °C) -- 59 13 91 % -- --   08/10/21 1502 (!) 122/54 -- -- 60 12 92 % -- --   08/10/21 1500 -- -- -- 59 -- -- -- --   08/10/21 1457 (!) 120/53 -- -- 61 12 91 % -- --   08/10/21 1452 (!) 113/55 -- -- 60 12 92 % -- --   08/10/21 1447 (!) 119/53 -- -- 63 10 91 % -- --   08/10/21 1445 -- -- -- 63 -- -- -- --   08/10/21 1442 (!) 125/52 -- -- 61 16 92 % -- --   08/10/21 1437 (!) 117/55 -- -- 68 15 92 % -- --   08/10/21 1432 113/70 -- -- 65 11 93 % -- --   08/10/21 1430 -- -- -- 74 -- -- -- --   08/10/21 1427 (!) 109/56 -- -- 80 16 93 % -- --   08/10/21 1422 (!) 114/52 98 °F (36.7 °C) Temporal 71 12 94 % -- --   08/10/21 1049 (!) 147/61 98.2 °F (36.8 °C) Temporal 79 16 96 % -- --       General: Alert cooperative   Wound: Clean dry intact. Moderate swelling   Neurovascular: Exam normal   DVT Exam: negative         Data Review:  Recent Labs     08/11/21  0527   HGB 8.6*     Recent Labs     08/11/21  0527      K 3.6   CREATININE 0.7   GLUCOSE 128*     No results for input(s): POCGLU in the last 72 hours. XR HIP 2-3 VW W PELVIS LEFT   Final Result   Impression:   1.  Status post LEFT total

## 2021-08-11 NOTE — PROGRESS NOTES
Physical Therapy  Name: Pao Watkins  MRN:  [id number]  Date of service:  8/11/2021 08/11/21 1512   Subjective   Subjective Attempt: Pt BTB with nursing assist, resting at this time.          Electronically signed by Dani Bah PTA on 8/11/2021 at 3:12 PM

## 2021-08-11 NOTE — PROGRESS NOTES
Pt only able to void small amount of urine throughout the night. Pt bladder scanned, showed 908cc. Straight cath placed using aseptic technique with immediate return of clear yellow urine. Pt tolerated procedure without difficulty.

## 2021-08-11 NOTE — PROGRESS NOTES
Physical Therapy    Facility/Department: [facility] SURG SERVICES  Initial Assessment    NAME: Aparna Webber  : 1945  MRN: [id number]    Date of Service: 2021    Discharge Recommendations:  Continue to assess pending progress, Patient would benefit from continued therapy after discharge (pt WOULD BE A GOOD CANDIDATE FOR SHORT TERM REHAB DUE TO TTWB STATUS.)        Assessment   Body structures, Functions, Activity limitations: Decreased functional mobility ; Increased pain;Decreased balance;Decreased strength;Decreased endurance  Assessment: pt WOULD BENEFIT FROM SKILLED PT IN THIS SETTING TO ADDRESS HER MOBILITY/STRENGTH DEFICITS RELATED TO POST OP THR. Prognosis: Good  Decision Making: Low Complexity  PT Education: General Safety;Gait Training;Functional Mobility Training;Transfer Training;Precautions;Weight-bearing Education;PT Role;Plan of Care  Patient Education: TTWB ON L LE  REQUIRES PT FOLLOW UP: Yes  Activity Tolerance  Activity Tolerance: Patient Tolerated treatment well       Patient Diagnosis(es): There were no encounter diagnoses. has a past medical history of Arthritis, Hyperlipidemia, and Hypertension. has a past surgical history that includes Hysterectomy and Appendectomy. Restrictions  Restrictions/Precautions  Restrictions/Precautions: Weight Bearing  Lower Extremity Weight Bearing Restrictions  Left Lower Extremity Weight Bearing: Toe Touch Weight Bearing  Vision/Hearing        Subjective  General  Patient assessed for rehabilitation services?: Yes  Diagnosis: L THR  Follows Commands: Within Functional Limits  Subjective  Subjective: pt STATES UNDERSTANDING OF TTWB AND EXPRESSES CONCERN ABOUT THE PAIN AND TRYING TO KEEP WEIGHT OFF L LE DURING TF'S  Pain Screening  Patient Currently in Pain: Yes  Pain Assessment  Pain Level: 6  Patient's Stated Pain Goal: 1  Pain Location: Hip  Pain Orientation: Left  Pain Descriptors: Discomfort; Sore  Pain Frequency: Intermittent  Pain Onset: Electronically signed by Cynthia Hernandez PT on 8/11/2021 at 9:50 AM

## 2021-08-11 NOTE — PROGRESS NOTES
Occupational Therapy   Occupational Therapy Initial Assessment  Date: 2021   Patient Name: Alex Paul  MRN: [id number]     : 1945    Date of Service: 2021    Discharge Recommendations:          Assessment   Performance deficits / Impairments: Decreased functional mobility ; Decreased ADL status; Decreased balance  Assessment: Will progress as tolerated  Treatment Diagnosis: L THR  Prognosis: Good  Decision Making: Low Complexity  REQUIRES OT FOLLOW UP: Yes  Activity Tolerance  Activity Tolerance: Patient Tolerated treatment well           Patient Diagnosis(es): There were no encounter diagnoses. has a past medical history of Arthritis, Hyperlipidemia, and Hypertension. has a past surgical history that includes Hysterectomy and Appendectomy. Treatment Diagnosis: L THR      Restrictions  Restrictions/Precautions  Restrictions/Precautions: Weight Bearing  Lower Extremity Weight Bearing Restrictions  Left Lower Extremity Weight Bearing: Toe Touch Weight Bearing    Subjective   General  Chart Reviewed: Yes  Patient assessed for rehabilitation services?: Yes  Family / Caregiver Present: No  Diagnosis: L THR  Patient Currently in Pain: Yes  Pain Assessment  Pain Level: 6  Patient's Stated Pain Goal: 1  Pain Type: Surgical pain  Pain Location: Hip  Pain Orientation: Left  Pain Descriptors: Discomfort; Sore  Pain Frequency: Intermittent  Pain Onset: On-going  Clinical Progression: Not changed  Functional Pain Assessment: Prevents or interferes some active activities and ADLs  Non-Pharmaceutical Pain Intervention(s): Ambulation/Increased Activity  Response to Pain Intervention: Patient Satisfied  Vital Signs  Temp: 97.1 °F (36.2 °C)  Temp Source: Temporal  Pulse: 99  Heart Rate Source: Monitor  Resp: 20  BP: (!) 106/48  Patient Currently in Pain: Yes  Oxygen Therapy  SpO2: 93 %  O2 Device: None (Room air)  Social/Functional History  Social/Functional History  Lives With: Spouse  Type of Home: House  Home Layout: One level  Home Equipment: Cane, Rolling walker  ADL Assistance: Needs assistance  Ambulation Assistance: Independent  Transfer Assistance: Independent       Objective   Vision: Within Functional Limits  Hearing: Within functional limits    Orientation  Overall Orientation Status: Within Normal Limits        ADL  Feeding: Independent  Grooming: Independent  UE Bathing: Supervision  LE Bathing: Minimal assistance  UE Dressing: Supervision  LE Dressing: Moderate assistance  Toileting: Moderate assistance        Bed mobility  Supine to Sit: Moderate assistance  Transfers  Stand Pivot Transfers: Moderate assistance  Sit to stand:  Moderate assistance;2 Person assistance     Cognition  Overall Cognitive Status: WFL                 LUE AROM (degrees)  LUE AROM : WFL  RUE AROM (degrees)  RUE AROM : WFL  LUE Strength  Gross LUE Strength: WFL  RUE Strength  Gross RUE Strength: WFL                   Plan   Plan  Times per week: 3-5x/week  Times per day: Daily    G-Code     OutComes Score                                                  AM-PAC Score             Goals  Short term goals  Time Frame for Short term goals: 1 week  Short term goal 1: Viviana with toilet tfers  Short term goal 2: Viviana with clothing mgmt while maintaining TTWB on LLE  Short term goal 3: Supervision with seated LB dsg with AE  Long term goals  Long term goal 1: Return to PLOF       Therapy Time   Individual Concurrent Group Co-treatment   Time In           Time Out           Minutes                   Christy Pacheco OT

## 2021-08-11 NOTE — CONSULTS
Referring Provider: Dr. Belén Ortiz  Reason for Consultation: Medical management    Patient Care Team:  Niru Conde DO as PCP - General (Family Medicine)    Chief complaint hip pain    Subjective . History of present illness: The patient presents to the orthopedic service for FANTA. They have failed outpatient conservative treatment of NSAIDS, muscle relaxer, physical therapy and opioid pain meds. The pain is affecting their activities of daily living and they have chosen to undergo surgical correction. We have been asked by the attending physician to provide medical consultation in the ramon-operative phase. the patient understands our role in their healthcare during this hospitalization. All questions were encouraged and answered to the best of our ability. The postoperative pain is as expected. There are no other participating or relieving factors noted.       REVIEW OF SYSTEMS:    CONSTITUTIONAL:  Negative for anorexia, chills, fevers, night sweats and weight loss  EYES:  negative for eye dryness, icterus and redness  HEENT:   negative for dental problems, epistaxis, facial trauma and thrush  RESPIRATORY:  negative for chest tightness, cough, dyspnea on exertion, pneumonia and sputum  CARDIOVASCULAR: negative for chest pain, dyspnea, exertional chest pressure/discomfort, irregular heart beat, palpitations, paroxysmal nocturnal dyspnea and syncope  GASTROINTESTINAL:  negative for abdominal pain, hematemesis, jaundice, melena and rectal bleeding  MUSCULOSKELETAL:  negative for muscle weakness, myalgias and neck pain, chronic joint pain noted  NEUROLOGICAL:   negative for dizziness, headaches, seizures, speech problems, tremors and vertigo  INTEGUMENT: negative for pruritus, rash, skin color change and skin lesion(s)   A Full 14 point review of systems is negative outside those listed above and in the HPI      History    Past Medical History:   Diagnosis Date    Arthritis     Hyperlipidemia     Hypertension      Past Surgical History:   Procedure Laterality Date    APPENDECTOMY      HYSTERECTOMY       History reviewed. No pertinent family history. Social History     Tobacco Use    Smoking status: Never Smoker    Smokeless tobacco: Never Used   Substance Use Topics    Alcohol use: Yes     Comment: occasional    Drug use: Not on file     Medications Prior to Admission: aspirin 81 MG EC tablet, Take 81 mg by mouth daily  Biotin 10 MG CAPS, Take 1 tablet by mouth daily  estrogens, conjugated, (PREMARIN) 0.3 MG tablet, Take 0.3 mg by mouth daily  verapamil (VERELAN) 240 MG extended release capsule, Take 240 mg by mouth nightly  simvastatin (ZOCOR) 20 MG tablet, Take 20 mg by mouth nightly  calcium carbonate (OSCAL) 500 MG TABS tablet, Take 500 mg by mouth daily  vitamin D3 (CHOLECALCIFEROL) 10 MCG (400 UNIT) TABS tablet, Take 800 Units by mouth daily  Pyridoxine HCl (VITAMIN B6 PO), Take 10 mg by mouth daily  Magnesium Hydroxide (MAGNESIA PO), Take 80 mg by mouth daily  BORON PO, Take 1 mg by mouth daily  meloxicam (MOBIC) 15 MG tablet, Take 15 mg by mouth daily  Patient has no known allergies. Objective     Vital Signs   All pre-op and post op vitals reviewed           Physical Exam:  Constitutional: oriented to person, place, and time. appears well-developed. HEENT:   Head: Normocephalic and atraumatic. Eyes: Pupils are equal, round, and reactive to light. Neck: Neck supple. Cardiovascular: Regular rhythm and normal heart sounds. No lift or rub appreciated. Pulmonary/Chest: Effort normal and breath sounds normal. CTAB. No labored breating. Abdominal: Soft. Bowel sounds are normal. There is no appreciable  distension. There is no point tenderness. no rebounding or guarding. Musculoskeletal: Normal range of motion on other than surgically repaired joint . no edema or tenderness other than surgical area. Post-op changes noted  Neurological:  alert and oriented to person, place, and time. normal reflexes. No focal deficits  Skin: Skin is warm and dry. No new rashes appreciated. Results Review:   I reviewed the patient's new imaging results and agree with the interpretation. Active Problems: Treatment recommendations    Primary osteoarthritis of left hip--postop care    Postop urinary retention--I&O cath, order for Flomax x1        I discussed the patients findings and my recommendations with patient/family, staff and the Orthopaedic team.  Homero Contreras PA-C  08/11/21  7:26 AM      POD #1, Looks good-plan to D/C in AM      Elevated post op sugar- related to perioperative steroid. Will adjust diet and snacks. Check a HbA1C if indicated. Review pre-op labs and med list. Explain to pt need for sugar control to promote healing and prevent post op infection. Need to follow up with PCP after hospitalization to make sure sugar returns to normal/preoperative levels. The patient was seen on rounds today. Reviewed the last 24 hours of labs and x-rays that are available. Updated overnight status with nursing staff. Reviewed the case with Valerie Varma PA-C. All questions were answered to the patient's/family's understanding. Patient is medically stable, please see orders for further details. I have discussed the care of Jann Fair, including pertinent history and exam findings with the ARNP/PA. I have seen and examined the patient and the key elements of all parts of the encounter have been performed by me. I agree with the assessment and plan as outlined by the ARNP/PA. Please refer to my separate note for complete documentation.      Electronically signed by Bia Leong MD on 8/11/2021 at 7:51 AM

## 2021-08-12 LAB
ANION GAP SERPL CALCULATED.3IONS-SCNC: 9 MMOL/L (ref 7–19)
BUN BLDV-MCNC: 22 MG/DL (ref 8–23)
CALCIUM SERPL-MCNC: 8.3 MG/DL (ref 8.8–10.2)
CHLORIDE BLD-SCNC: 99 MMOL/L (ref 98–111)
CO2: 29 MMOL/L (ref 22–29)
CREAT SERPL-MCNC: 1 MG/DL (ref 0.5–0.9)
GFR AFRICAN AMERICAN: >59
GFR NON-AFRICAN AMERICAN: 54
GLUCOSE BLD-MCNC: 122 MG/DL (ref 74–109)
HCT VFR BLD CALC: 25.2 % (ref 37–47)
HEMOGLOBIN: 8.3 G/DL (ref 12–16)
IRON SATURATION: 3 % (ref 14–50)
IRON: 6 UG/DL (ref 37–145)
POTASSIUM REFLEX MAGNESIUM: 3.8 MMOL/L (ref 3.5–5)
SODIUM BLD-SCNC: 137 MMOL/L (ref 136–145)
TOTAL IRON BINDING CAPACITY: 190 UG/DL (ref 250–400)
VITAMIN B-12: 560 PG/ML (ref 211–946)

## 2021-08-12 PROCEDURE — 83540 ASSAY OF IRON: CPT

## 2021-08-12 PROCEDURE — 80048 BASIC METABOLIC PNL TOTAL CA: CPT

## 2021-08-12 PROCEDURE — 2580000003 HC RX 258: Performed by: ORTHOPAEDIC SURGERY

## 2021-08-12 PROCEDURE — 85014 HEMATOCRIT: CPT

## 2021-08-12 PROCEDURE — 36415 COLL VENOUS BLD VENIPUNCTURE: CPT

## 2021-08-12 PROCEDURE — 83550 IRON BINDING TEST: CPT

## 2021-08-12 PROCEDURE — 85018 HEMOGLOBIN: CPT

## 2021-08-12 PROCEDURE — 82607 VITAMIN B-12: CPT

## 2021-08-12 PROCEDURE — 6370000000 HC RX 637 (ALT 250 FOR IP): Performed by: ORTHOPAEDIC SURGERY

## 2021-08-12 PROCEDURE — 97535 SELF CARE MNGMENT TRAINING: CPT

## 2021-08-12 PROCEDURE — 97116 GAIT TRAINING THERAPY: CPT

## 2021-08-12 PROCEDURE — G0378 HOSPITAL OBSERVATION PER HR: HCPCS

## 2021-08-12 RX ADMIN — CHOLECALCIFEROL TAB 10 MCG (400 UNIT) 800 UNITS: 10 TAB at 07:40

## 2021-08-12 RX ADMIN — SODIUM CHLORIDE, PRESERVATIVE FREE 10 ML: 5 INJECTION INTRAVENOUS at 07:30

## 2021-08-12 RX ADMIN — DOCUSATE SODIUM 50 MG AND SENNOSIDES 8.6 MG 1 TABLET: 8.6; 5 TABLET, FILM COATED ORAL at 07:40

## 2021-08-12 RX ADMIN — ASPIRIN 325 MG: 325 TABLET, COATED ORAL at 22:08

## 2021-08-12 RX ADMIN — ACETAMINOPHEN 650 MG: 325 TABLET ORAL at 17:35

## 2021-08-12 RX ADMIN — ATORVASTATIN CALCIUM 10 MG: 10 TABLET, FILM COATED ORAL at 22:08

## 2021-08-12 RX ADMIN — DOCUSATE SODIUM 50 MG AND SENNOSIDES 8.6 MG 1 TABLET: 8.6; 5 TABLET, FILM COATED ORAL at 22:08

## 2021-08-12 RX ADMIN — ACETAMINOPHEN 650 MG: 325 TABLET ORAL at 22:08

## 2021-08-12 RX ADMIN — OXYCODONE 10 MG: 5 TABLET ORAL at 23:04

## 2021-08-12 RX ADMIN — OXYCODONE 10 MG: 5 TABLET ORAL at 07:44

## 2021-08-12 RX ADMIN — ASPIRIN 325 MG: 325 TABLET, COATED ORAL at 07:40

## 2021-08-12 RX ADMIN — MELOXICAM 15 MG: 7.5 TABLET ORAL at 07:41

## 2021-08-12 ASSESSMENT — PAIN DESCRIPTION - DESCRIPTORS
DESCRIPTORS: SORE;ACHING
DESCRIPTORS: ACHING;DISCOMFORT

## 2021-08-12 ASSESSMENT — PAIN DESCRIPTION - ORIENTATION
ORIENTATION: LEFT
ORIENTATION: LEFT

## 2021-08-12 ASSESSMENT — PAIN DESCRIPTION - PAIN TYPE
TYPE: ACUTE PAIN
TYPE: ACUTE PAIN;SURGICAL PAIN

## 2021-08-12 ASSESSMENT — PAIN SCALES - GENERAL
PAINLEVEL_OUTOF10: 7
PAINLEVEL_OUTOF10: 7
PAINLEVEL_OUTOF10: 3
PAINLEVEL_OUTOF10: 7
PAINLEVEL_OUTOF10: 3
PAINLEVEL_OUTOF10: 7
PAINLEVEL_OUTOF10: 7

## 2021-08-12 ASSESSMENT — PAIN DESCRIPTION - LOCATION
LOCATION: HIP
LOCATION: HIP

## 2021-08-12 ASSESSMENT — PAIN DESCRIPTION - FREQUENCY
FREQUENCY: INTERMITTENT
FREQUENCY: INTERMITTENT

## 2021-08-12 ASSESSMENT — PAIN - FUNCTIONAL ASSESSMENT: PAIN_FUNCTIONAL_ASSESSMENT: PREVENTS OR INTERFERES SOME ACTIVE ACTIVITIES AND ADLS

## 2021-08-12 NOTE — CARE COORDINATION
Accepted to [facility] but will need insurance approval. Pre cert initiated. CM will be notified of pre cert approval. Patient will need a neg Covid prior to admission. Awaiting call back.     [facility] (formerly [address] [place] [address])   [phone] [address] [facility] signed by Red Mitchell RN, BSN on 8/12/2021 at 1:17 PM

## 2021-08-12 NOTE — CARE COORDINATION
HH referral received. Per chart, patient will be discharging to SNF.   No further HH interventions Electronically signed by Hussain Conway on 8/12/21 at 10:31 AM CDT

## 2021-08-12 NOTE — PROGRESS NOTES
[facility]  Daily Progress Note  Susan Orellana  MRN: [id number] LOS: 0    Admit Date: 8/10/2021   8/12/2021 7:44 AM          Chief Complaint:  Left hip pain    Interval History:    Reviewed overnight events and nursing notes  Postoperative pain is controlled  Denies chest pain  No shortness of breath  Appetite and bowel function returning. DIET:  ADULT DIET; Regular  Adult Oral Nutrition Supplement; Standard High Calorie/High Protein Oral Supplement    Medications:      sodium chloride        meloxicam  15 mg Oral Daily    atorvastatin  10 mg Oral Nightly    vitamin D3  800 Units Oral Daily    sodium chloride flush  10 mL Intravenous 2 times per day    acetaminophen  650 mg Oral Q6H    sennosides-docusate sodium  1 tablet Oral BID    aspirin  325 mg Oral BID    verapamil  240 mg Oral Nightly       Data:     Code Status: Full Code    History reviewed. No pertinent family history. Social History     Socioeconomic History    Marital status:      Spouse name: Not on file    Number of children: Not on file    Years of education: Not on file    Highest education level: Not on file   Occupational History    Not on file   Tobacco Use    Smoking status: Never Smoker    Smokeless tobacco: Never Used   Substance and Sexual Activity    Alcohol use: Yes     Comment: occasional    Drug use: Not on file    Sexual activity: Not on file   Other Topics Concern    Not on file   Social History Narrative    Not on file     Social Determinants of Health     Financial Resource Strain:     Difficulty of Paying Living Expenses:    Food Insecurity:     Worried About Running Out of Food in the Last Year:     [address] in the Last Year:    Transportation Needs:     Lack of Transportation (Medical):      Lack of Transportation (Non-Medical):    Physical Activity:     Days of Exercise per Week:     Minutes of Exercise per Session:    Stress:     Feeling of Stress :    Social Connections:  Frequency of Communication with Friends and Family:     Frequency of Social Gatherings with Friends and Family:     Attends Confucianism Services:     Active Member of Clubs or Organizations:     Attends Club or Organization Meetings:     Marital Status:    Intimate Partner Violence:     Fear of Current or Ex-Partner:     Emotionally Abused:     Physically Abused:     Sexually Abused:        Labs:  Hematology:  Recent Labs     21  0343   HGB 8.6* 8.3*   HCT 26.3* 25.2*     Chemistry:  Recent Labs     21  0343    137   K 3.6 3.8    99   CO2 28 29   GLUCOSE 128* 122*   BUN 12 22   CREATININE 0.7 1.0*   ANIONGAP 8 9   LABGLOM >60 54*   GFRAA >59 >59   CALCIUM 8.0* 8.3*     No results for input(s): PROT, LABALBU, LABA1C, P1EGEWK, H2BELQW, FT4, TSH, AST, ALT, LDH, GGT, ALKPHOS, BILITOT, BILIDIR, AMMONIA, AMYLASE, LIPASE, LACTATE, CHOL, HDL, LDLCHOLESTEROL, CHOLHDLRATIO, TRIG, VLDL, PHENYTOIN, PHENYF in the last 72 hours. Objective:     Vitals: BP (!) 103/52   Pulse 73   Temp 98.1 °F (36.7 °C) (Temporal)   Resp 20   Ht 5' 3\" (1.6 m)   Wt 138 lb (62.6 kg)   SpO2 92%   BMI 24.45 kg/m²      Intake/Output Summary (Last 24 hours) at 2021 0744  Last data filed at 2021 0352  Gross per 24 hour   Intake 685 ml   Output 300 ml   Net 385 ml    Temp (24hrs), Av.8 °F (36.6 °C), Min:97.1 °F (36.2 °C), Max:98.4 °F (36.9 °C)    Glucose:  No results for input(s): POCGLU in the last 72 hours.   Physical Examination:   General appearance - alert, well appearing, and in no distress and oriented to person, place, and time  Mouth - mucous membranes moist, pharynx normal without lesions  Neck - supple, no significant adenopathy  Lymphatics - no palpable lymphadenopathy, no hepatosplenomegaly  Chest - clear to auscultation, no wheezes, rales or rhonchi, symmetric air entry, no tachypnea, retractions or cyanosis  Heart - normal rate, regular rhythm, normal S1, S2, no murmurs, rubs, clicks or gallops  Abdomen - soft, nontender, nondistended, no masses or organomegaly bowel sounds normal  Neurological - alert, oriented, normal speech, no focal findings or movement disorder noted  Musculoskeletal - no joint tenderness, deformity or swelling  Extremities - peripheral pulses normal, no pedal edema, no clubbing or cyanosis  Skin - normal coloration and turgor, no rashes, no suspicious skin lesions noted      Assessment and Plan:       Hospital Problem list:  Active Problems:    Primary osteoarthritis of left hip--postop care    Acute postoperative blood loss anemia--stable, expected, being monitored with daily labs. Anemia profile ordered, replace substrates as indicated. Hemoglobin stable 8.3    Slow transit constipation--continue with bowel regimen    Postop urinary retention--resolved with Flomax    Essential hypertension--stable, continue same    Medically stable postop day #2  Encourage incentive spirometry every hour while awake  Early mobilization, maximize efforts with therapy  Continue course of care monitor for change  Cleared for discharge when bed available and approved by insurance--[facility] SNF for further rehab    Reviewed treatment plans with the patient and nursing staff  20 minutes spent in face to face interaction and coordination of care. Electronically signed by Tresa Hernandez PA-C on 8/12/2021 at 7:44 AM     Medically stable for SNF when bed available      The patient was seen on rounds today. Reviewed the last 24 hours of labs and x-rays that are available. Updated overnight status with nursing staff. Reviewed the case with Leila Zhao PA-C. All questions were answered to the patient's/family's understanding. Patient is medically stable, please see orders for further details. I have discussed the care of Kelly Mon, including pertinent history and exam findings with the ARNP/PA.   I have seen and examined the patient and the key elements

## 2021-08-12 NOTE — PROGRESS NOTES
Subjective:     Post-Operative Day: 2 No complaints. A lot of pain. Objective:     Patient Vitals for the past 24 hrs:   BP Temp Temp src Pulse Resp SpO2   08/12/21 0352 (!) 101/53 98.1 °F (36.7 °C) Temporal 73 16 92 %   08/11/21 2011 (!) 96/53 98.4 °F (36.9 °C) Temporal 73 16 94 %   08/11/21 1426 124/64 97.4 °F (36.3 °C) Temporal 88 20 95 %   08/11/21 0747 (!) 106/48 97.1 °F (36.2 °C) Temporal 99 20 93 %       General: Alert cooperative   Wound: Clean dry intact. Moderate swelling   Neurovascular: Exam normal   DVT Exam: negative         Data Review:  Recent Labs     08/11/21  0527 08/12/21  0343   HGB 8.6* 8.3*     Recent Labs     08/12/21  0343      K 3.8   CREATININE 1.0*   GLUCOSE 122*           Assessment:     Status Post left Total Hip Arthroplasty. Doing well postop without complications .  Small nondisplaced calcar fracture  Plan:   Toe touch weight bearing x 3 weeks  Pain control  Keep accuchecks under 200  PT/OT  DVT prophylaxis  Ice and elevate  Discharge To a non-[facility] facility this week

## 2021-08-12 NOTE — PROGRESS NOTES
Physical Therapy  Name: Sil Robles  MRN:  [id number]  Date of service:  8/12/2021 08/12/21 1358   Restrictions/Precautions   Restrictions/Precautions Weight Bearing   Lower Extremity Weight Bearing Restrictions   Left Lower Extremity Weight Bearing Toe Touch Weight Bearing   Subjective   Subjective Pt up in bed and agreed to therapy. Pain Screening   Patient Currently in Pain Yes   Pain Assessment   Pain Assessment 0-10   Pain Level 7   Pain Type Acute pain   Pain Location Hip   Pain Orientation Left   Pain Descriptors Sore;Aching   Pain Frequency Intermittent   Functional Pain Assessment Prevents or interferes some active activities and ADLs   Non-Pharmaceutical Pain Intervention(s) Ambulation/Increased Activity;Repositioned; Rest   Response to Pain Intervention Patient Satisfied   Bed Mobility   Supine to Sit Minimal assistance  (Assisted LE's)   Transfers   Sit to Stand Contact guard assistance   Stand to sit Contact guard assistance   Ambulation   Ambulation? Yes   Ambulation 1   Surface level tile   Device Rolling Walker   Assistance Contact guard assistance   Gait Deviations Decreased step length; Slow Елена   Distance 15 ft   Other Activities   Comment Pt stood at sink x5 min for hygieine with SBA   Short term goals   Time Frame for Short term goals 2 WKS   Short term goal 1 SUP<>SIT, MIN A 1   Short term goal 2 SIT<>STAND, MIN A 1   Short term goal 3 TF'S WITH RW, MIN A 1   Conditions Requiring Skilled Therapeutic Intervention   Body structures, Functions, Activity limitations Decreased functional mobility ; Increased pain;Decreased balance;Decreased strength;Decreased endurance   Assessment Pt able to amb maintaining TTWB in room and to bathroom. Pt steady with gait and able to stand at sink with SBA. Pt up in chair with call light in reach and chair alarm on.    Activity Tolerance   Activity Tolerance Patient Tolerated treatment well   Safety Devices   Type of devices Chair alarm in place;Call light within reach; Left in chair         Electronically signed by Daralyn Canavan, PTA on 8/12/2021 at 2:09 PM

## 2021-08-12 NOTE — PROGRESS NOTES
Occupational Therapy     08/12/21 1412   Restrictions/Precautions   Restrictions/Precautions Weight Bearing   Vision   Vision [facility]   Hearing   Hearing [facility]   General   Chart Reviewed Yes   Patient assessed for rehabilitation services? Yes   Response to previous treatment Patient with no complaints from previous session   Family / Caregiver Present No   Diagnosis L THR   Subjective   Subjective Pt in bed upon arrival for therapy. Pt states \"I am glad you are here, I need to use the bathroom\". Pain Assessment   Patient Currently in Pain Yes   Pain Assessment 0-10   Pain Level 7   Pain Type Acute pain;Surgical pain   Pain Location Hip   Pain Orientation Left   Pain Descriptors Aching;Discomfort   Pain Frequency Intermittent   Response to Pain Intervention Patient Satisfied   Pre Treatment Pain Screening   Pain at present 7   Scale Used Numeric Score   Intervention List Patient able to continue with treatment;Patient unable to be redirected to participate in therapy   Comments / Details Worse when moving. ADL   Feeding Independent   Grooming Independent   UE Bathing Supervision   LE Bathing Minimal assistance   UE Dressing Supervision   LE Dressing Moderate assistance   Toileting Moderate assistance   Balance   Sitting Balance Independent   Standing Balance Contact guard assistance   Functional Mobility   Functional - Mobility Device Rolling Walker   Activity To/from bathroom   Assist Level Contact guard assistance   Bed mobility   Rolling to Right Minimal assistance   Supine to Sit Moderate assistance   Scooting Minimal assistance   Transfers   Stand Step Transfers Contact guard assistance   Sit to stand Minimal assistance   Stand to sit Minimal assistance   Toilet Transfers   Toilet - Technique Stand step; Ambulating   Equipment Used Raised toilet seat with rails   Toilet Transfer Contact guard assistance   Toilet Transfers Comments with cues for sequencing and safety    Assessment   Performance deficits / Impairments Decreased functional mobility ; Decreased ADL status; Decreased balance   Assessment Will progress as tolerated   Treatment Diagnosis L THR   Prognosis Good   REQUIRES OT FOLLOW UP Yes   Treatment Initiated  Tx focused on toileting task and h/g task at sink in standing. Pt agreeable to sit up in recliner after tasks. Discharge Recommendations Patient would benefit from continued therapy after discharge   Activity Tolerance   Activity Tolerance Patient Tolerated treatment well   Safety Devices   Safety Devices in place Yes   Type of devices Call light within reach; Chair alarm in place   Other Comments   Comments Pt wants a shower tomorrow. Needs shower bench in room. Nursing cleared her for shower.     Plan   Times per week 3-5x/week   Times per day Daily   Electronically signed by SAROJ Paulino on 8/12/2021 at 2:19 PM

## 2021-08-13 LAB
ANION GAP SERPL CALCULATED.3IONS-SCNC: 10 MMOL/L (ref 7–19)
BUN BLDV-MCNC: 20 MG/DL (ref 8–23)
CALCIUM SERPL-MCNC: 8.4 MG/DL (ref 8.8–10.2)
CHLORIDE BLD-SCNC: 100 MMOL/L (ref 98–111)
CO2: 27 MMOL/L (ref 22–29)
CREAT SERPL-MCNC: 0.7 MG/DL (ref 0.5–0.9)
GFR AFRICAN AMERICAN: >59
GFR NON-AFRICAN AMERICAN: >60
GLUCOSE BLD-MCNC: 89 MG/DL (ref 74–109)
HCT VFR BLD CALC: 24.5 % (ref 37–47)
HEMOGLOBIN: 7.7 G/DL (ref 12–16)
POTASSIUM REFLEX MAGNESIUM: 4.1 MMOL/L (ref 3.5–5)
SODIUM BLD-SCNC: 137 MMOL/L (ref 136–145)

## 2021-08-13 PROCEDURE — G0378 HOSPITAL OBSERVATION PER HR: HCPCS

## 2021-08-13 PROCEDURE — 6370000000 HC RX 637 (ALT 250 FOR IP): Performed by: ORTHOPAEDIC SURGERY

## 2021-08-13 PROCEDURE — 80048 BASIC METABOLIC PNL TOTAL CA: CPT

## 2021-08-13 PROCEDURE — 6370000000 HC RX 637 (ALT 250 FOR IP): Performed by: PHYSICIAN ASSISTANT

## 2021-08-13 PROCEDURE — 85014 HEMATOCRIT: CPT

## 2021-08-13 PROCEDURE — 36415 COLL VENOUS BLD VENIPUNCTURE: CPT

## 2021-08-13 PROCEDURE — 85018 HEMOGLOBIN: CPT

## 2021-08-13 PROCEDURE — 2580000003 HC RX 258: Performed by: ORTHOPAEDIC SURGERY

## 2021-08-13 PROCEDURE — 97535 SELF CARE MNGMENT TRAINING: CPT

## 2021-08-13 RX ORDER — OXYCODONE HYDROCHLORIDE 5 MG/1
5 TABLET ORAL EVERY 4 HOURS PRN
Qty: 30 TABLET | Refills: 0 | Status: SHIPPED | OUTPATIENT
Start: 2021-08-13 | End: 2021-08-16

## 2021-08-13 RX ORDER — CALCIUM CARBONATE 200(500)MG
500 TABLET,CHEWABLE ORAL 3 TIMES DAILY PRN
Status: DISCONTINUED | OUTPATIENT
Start: 2021-08-13 | End: 2021-08-14 | Stop reason: HOSPADM

## 2021-08-13 RX ORDER — FERROUS SULFATE 325(65) MG
325 TABLET ORAL 2 TIMES DAILY WITH MEALS
Status: DISCONTINUED | OUTPATIENT
Start: 2021-08-13 | End: 2021-08-14 | Stop reason: HOSPADM

## 2021-08-13 RX ORDER — ONDANSETRON 2 MG/ML
4 INJECTION INTRAMUSCULAR; INTRAVENOUS EVERY 6 HOURS PRN
Status: DISCONTINUED | OUTPATIENT
Start: 2021-08-13 | End: 2021-08-14 | Stop reason: HOSPADM

## 2021-08-13 RX ORDER — ASPIRIN 81 MG/1
81 TABLET ORAL 2 TIMES DAILY
Qty: 60 TABLET | Refills: 0 | Status: SHIPPED | OUTPATIENT
Start: 2021-08-13

## 2021-08-13 RX ADMIN — ACETAMINOPHEN 650 MG: 325 TABLET ORAL at 10:27

## 2021-08-13 RX ADMIN — ACETAMINOPHEN 650 MG: 325 TABLET ORAL at 22:21

## 2021-08-13 RX ADMIN — MAGNESIUM HYDROXIDE 30 ML: 400 SUSPENSION ORAL at 10:27

## 2021-08-13 RX ADMIN — ASPIRIN 325 MG: 325 TABLET, COATED ORAL at 10:27

## 2021-08-13 RX ADMIN — DOCUSATE SODIUM 50 MG AND SENNOSIDES 8.6 MG 1 TABLET: 8.6; 5 TABLET, FILM COATED ORAL at 10:27

## 2021-08-13 RX ADMIN — ASPIRIN 325 MG: 325 TABLET, COATED ORAL at 22:21

## 2021-08-13 RX ADMIN — ATORVASTATIN CALCIUM 10 MG: 10 TABLET, FILM COATED ORAL at 22:21

## 2021-08-13 RX ADMIN — SODIUM CHLORIDE, PRESERVATIVE FREE 10 ML: 5 INJECTION INTRAVENOUS at 09:13

## 2021-08-13 RX ADMIN — CHOLECALCIFEROL TAB 10 MCG (400 UNIT) 800 UNITS: 10 TAB at 10:27

## 2021-08-13 RX ADMIN — OXYCODONE 10 MG: 5 TABLET ORAL at 22:21

## 2021-08-13 RX ADMIN — SODIUM CHLORIDE, PRESERVATIVE FREE 10 ML: 5 INJECTION INTRAVENOUS at 22:22

## 2021-08-13 RX ADMIN — MELOXICAM 15 MG: 7.5 TABLET ORAL at 10:29

## 2021-08-13 RX ADMIN — VERAPAMIL HYDROCHLORIDE 240 MG: 240 TABLET, FILM COATED, EXTENDED RELEASE ORAL at 22:21

## 2021-08-13 RX ADMIN — FERROUS SULFATE TAB 325 MG (65 MG ELEMENTAL FE) 325 MG: 325 (65 FE) TAB at 10:27

## 2021-08-13 ASSESSMENT — PAIN DESCRIPTION - FREQUENCY: FREQUENCY: INTERMITTENT

## 2021-08-13 ASSESSMENT — PAIN DESCRIPTION - PROGRESSION: CLINICAL_PROGRESSION: NOT CHANGED

## 2021-08-13 ASSESSMENT — PAIN - FUNCTIONAL ASSESSMENT: PAIN_FUNCTIONAL_ASSESSMENT: PREVENTS OR INTERFERES SOME ACTIVE ACTIVITIES AND ADLS

## 2021-08-13 ASSESSMENT — PAIN DESCRIPTION - PAIN TYPE: TYPE: ACUTE PAIN;SURGICAL PAIN

## 2021-08-13 ASSESSMENT — PAIN SCALES - GENERAL
PAINLEVEL_OUTOF10: 5
PAINLEVEL_OUTOF10: 7
PAINLEVEL_OUTOF10: 7
PAINLEVEL_OUTOF10: 4

## 2021-08-13 ASSESSMENT — PAIN DESCRIPTION - LOCATION: LOCATION: HIP

## 2021-08-13 ASSESSMENT — PAIN DESCRIPTION - ORIENTATION: ORIENTATION: LEFT

## 2021-08-13 ASSESSMENT — PAIN DESCRIPTION - DESCRIPTORS: DESCRIPTORS: ACHING;DISCOMFORT;SORE

## 2021-08-13 ASSESSMENT — PAIN DESCRIPTION - ONSET: ONSET: ON-GOING

## 2021-08-13 NOTE — PROGRESS NOTES
[facility]  Daily Progress Note  Anastacia Trejo  MRN: [id number] LOS: 0    Admit Date: 8/10/2021   8/13/2021 7:29 AM          Chief Complaint:  Left hip pain    Interval History:    Reviewed overnight events and nursing notes  Postoperative pain is controlled  Denies chest pain  No shortness of breath  Appetite and bowel function returning. DIET:  ADULT DIET; Regular  Adult Oral Nutrition Supplement; Standard High Calorie/High Protein Oral Supplement    Medications:      sodium chloride        meloxicam  15 mg Oral Daily    atorvastatin  10 mg Oral Nightly    vitamin D3  800 Units Oral Daily    sodium chloride flush  10 mL Intravenous 2 times per day    acetaminophen  650 mg Oral Q6H    sennosides-docusate sodium  1 tablet Oral BID    aspirin  325 mg Oral BID    verapamil  240 mg Oral Nightly       Data:     Code Status: Full Code    History reviewed. No pertinent family history. Social History     Socioeconomic History    Marital status:      Spouse name: Not on file    Number of children: Not on file    Years of education: Not on file    Highest education level: Not on file   Occupational History    Not on file   Tobacco Use    Smoking status: Never Smoker    Smokeless tobacco: Never Used   Substance and Sexual Activity    Alcohol use: Yes     Comment: occasional    Drug use: Not on file    Sexual activity: Not on file   Other Topics Concern    Not on file   Social History Narrative    Not on file     Social Determinants of Health     Financial Resource Strain:     Difficulty of Paying Living Expenses:    Food Insecurity:     Worried About Running Out of Food in the Last Year:     [address] in the Last Year:    Transportation Needs:     Lack of Transportation (Medical):      Lack of Transportation (Non-Medical):    Physical Activity:     Days of Exercise per Week:     Minutes of Exercise per Session:    Stress:     Feeling of Stress :    Social Connections:  Frequency of Communication with Friends and Family:     Frequency of Social Gatherings with Friends and Family:     Attends Orthodox Services:     Active Member of Clubs or Organizations:     Attends Club or Organization Meetings:     Marital Status:    Intimate Partner Violence:     Fear of Current or Ex-Partner:     Emotionally Abused:     Physically Abused:     Sexually Abused:        Labs:  Hematology:  Recent Labs     212   HGB 8.6* 8.3* 7.7*   HCT 26.3* 25.2* 24.5*     Chemistry:  Recent Labs     21    137 137   K 3.6 3.8 4.1    99 100   CO2 28 29 27   GLUCOSE 128* 122* 89   BUN 12 22 20   CREATININE 0.7 1.0* 0.7   ANIONGAP 8 9 10   LABGLOM >60 54* >60   GFRAA >59 >59 >59   CALCIUM 8.0* 8.3* 8.4*     No results for input(s): PROT, LABALBU, LABA1C, H0JJFYM, Z8AIHQF, FT4, TSH, AST, ALT, LDH, GGT, ALKPHOS, BILITOT, BILIDIR, AMMONIA, AMYLASE, LIPASE, LACTATE, CHOL, HDL, LDLCHOLESTEROL, CHOLHDLRATIO, TRIG, VLDL, PHENYTOIN, PHENYF in the last 72 hours. Objective:     Vitals: BP (!) 117/49   Pulse 71   Temp 96.5 °F (35.8 °C) (Temporal)   Resp 16   Ht 5' 3\" (1.6 m)   Wt 138 lb (62.6 kg)   SpO2 93%   BMI 24.45 kg/m²      Intake/Output Summary (Last 24 hours) at 2021 0729  Last data filed at 2021  Gross per 24 hour   Intake 650 ml   Output --   Net 650 ml    Temp (24hrs), Av.2 °F (36.2 °C), Min:96.5 °F (35.8 °C), Max:98 °F (36.7 °C)    Glucose:  No results for input(s): POCGLU in the last 72 hours.   Physical Examination:   General appearance - alert, well appearing, and in no distress and oriented to person, place, and time  Mouth - mucous membranes moist, pharynx normal without lesions  Neck - supple, no significant adenopathy  Lymphatics - no palpable lymphadenopathy, no hepatosplenomegaly  Chest - clear to auscultation, no wheezes, rales or rhonchi, symmetric air entry, no tachypnea, retractions or cyanosis  Heart - normal rate, regular rhythm, normal S1, S2, no murmurs, rubs, clicks or gallops  Abdomen - soft, nontender, nondistended, no masses or organomegaly bowel sounds normal  Neurological - alert, oriented, normal speech, no focal findings or movement disorder noted  Musculoskeletal - no joint tenderness, deformity or swelling  Extremities - peripheral pulses normal, no pedal edema, no clubbing or cyanosis  Skin - normal coloration and turgor, no rashes, no suspicious skin lesions noted      Assessment and Plan:       Hospital Problem list:  Active Problems:    Primary osteoarthritis of left hip--postop care    Acute postoperative blood loss anemia--stable, expected, being monitored with daily labs. Hemoglobin stable 7.7, orders for iron replacement    Slow transit constipation--continue with bowel regimen    Postop urinary retention--resolved with Flomax    Essential hypertension--stable, continue same    Medically stable postop day #3  Encourage incentive spirometry every hour while awake  Early mobilization, maximize efforts with therapy  Continue course of care monitor for change  Cleared for discharge when bed available and approved by insurance--[facility] SNF for further rehab    Reviewed treatment plans with the patient and nursing staff  20 minutes spent in face to face interaction and coordination of care. Electronically signed by Stephanie Menjivar PA-C on 8/13/2021 at 7:29 AM     I have discussed the care of Gerry Schneider, including pertinent history and exam findings with the ARNP/PA. I have seen and examined the patient and the key elements of all parts of the encounter have been performed by me. I agree with the assessment and plan as outlined by the ARNP/PA. Replete and low iron orally. Will need follow-up iron levels outpatient and if does not respond to oral should consider outpatient IV iron infusions.     Electronically signed by Jus Liao MD on 8/13/2021 at 8:18 AM

## 2021-08-13 NOTE — PROGRESS NOTES
Subjective:     Post-Operative Day: 3 No complaints. better    Objective:     Patient Vitals for the past 24 hrs:   BP Temp Temp src Pulse Resp SpO2   08/13/21 0304 (!) 117/49 96.5 °F (35.8 °C) Temporal 71 16 93 %   08/13/21 0005 (!) 109/55 96.6 °F (35.9 °C) Temporal 75 16 94 %   08/12/21 2058 (!) 113/54 97.2 °F (36.2 °C) Temporal 80 16 94 %   08/12/21 1445 (!) 104/54 97.9 °F (36.6 °C) Temporal 74 20 94 %   08/12/21 1045 (!) 101/51 98 °F (36.7 °C) Temporal 72 20 93 %       General: Alert cooperative   Wound: Clean dry intact. Moderate swelling   Neurovascular: Exam normal   DVT Exam: negative         Data Review:  Recent Labs     08/12/21  0343 08/13/21  0412   HGB 8.3* 7.7*     Recent Labs     08/13/21  0412      K 4.1   CREATININE 0.7   GLUCOSE 89           Assessment:     Status Post left Total Hip Arthroplasty. Doing well postop without complications .  Small nondisplaced calcar fracture  Plan:   Toe touch weight bearing x 3 weeks  Pain control  Keep accuchecks under 200  PT/OT  DVT prophylaxis  Ice and elevate  Discharge To a non-[facility] facility today

## 2021-08-13 NOTE — PROGRESS NOTES
Occupational Therapy     08/13/21 1105   Restrictions/Precautions   Restrictions/Precautions Weight Bearing   Vision   Vision [facility]   Hearing   Hearing [facility]   General   Chart Reviewed Yes   Patient assessed for rehabilitation services? Yes   Response to previous treatment Patient with no complaints from previous session   Family / Caregiver Present No   Diagnosis L THR   Subjective   Subjective Pt wanting a shower this date. Pain Assessment   Patient Currently in Pain Denies   Orientation   Overall Orientation Status WFL   ADL   Feeding Independent   Grooming Independent   UE Bathing Supervision   LE Bathing Minimal assistance   UE Dressing Supervision   LE Dressing Moderate assistance   Toileting Contact guard assistance;Minimal assistance  (for stedy balance and clothing mngt.)   Additional Comments Pt requires cues for sequencing and safety with STS<>STS mobility. Would benefit from Hip Kit tools for LBD. Balance   Sitting Balance Independent   Standing Balance Contact guard assistance   Functional Mobility   Functional - Mobility Device Rolling Walker   Activity To/from bathroom   Assist Level Contact guard assistance   Bed mobility   Supine to Sit Stand by assistance   Sit to Supine Minimal assistance   Scooting Stand by assistance   Transfers   Stand Step Transfers Contact guard assistance   Sit to stand Contact guard assistance   Stand to sit Contact guard assistance   Toilet Transfers   Toilet - Technique Ambulating   Equipment Used Raised toilet seat with rails   Toilet Transfer Contact guard assistance   Assessment   Performance deficits / Impairments Decreased functional mobility ; Decreased ADL status; Decreased balance   Assessment Will progress as tolerated   Treatment Diagnosis L THR   Prognosis Good   REQUIRES OT FOLLOW UP Yes   Treatment Initiated  Tx focused on maintaining WB status during functional mobility and ADL techniques.     Discharge Recommendations Patient would benefit from continued therapy after discharge   Activity Tolerance   Activity Tolerance Patient Tolerated treatment well   Safety Devices   Safety Devices in place Yes   Type of devices Call light within reach; Chair alarm in place   Plan   Times per week 3-5x/week   Times per day Daily   Electronically signed by Delcia Goldmann, OTA on 8/13/2021 at 11:10 AM

## 2021-08-13 NOTE — DISCHARGE SUMMARY
[facility] [address]  Dr. Xavier Shaw  Discharge Summary      The Kathy Patel is a 77 y.o. female underwent total hip replacement procedure without complication. Kathy Patel was admitted to the floor following their recovery in the PACU. Discharge Diagnosis  left Hip Replacement    Current Inpatient Medications    Current Facility-Administered Medications: ferrous sulfate (IRON 325) tablet 325 mg, 325 mg, Oral, BID WC  meloxicam (MOBIC) tablet 15 mg, 15 mg, Oral, Daily  atorvastatin (LIPITOR) tablet 10 mg, 10 mg, Oral, Nightly  vitamin D3 (CHOLECALCIFEROL) tablet 800 Units, 800 Units, Oral, Daily  sodium chloride flush 0.9 % injection 10 mL, 10 mL, Intravenous, 2 times per day  sodium chloride flush 0.9 % injection 10 mL, 10 mL, Intravenous, PRN  0.9 % sodium chloride infusion, 25 mL, Intravenous, PRN  acetaminophen (TYLENOL) tablet 650 mg, 650 mg, Oral, Q6H  oxyCODONE (ROXICODONE) immediate release tablet 5 mg, 5 mg, Oral, Q4H PRN **OR** oxyCODONE (ROXICODONE) immediate release tablet 10 mg, 10 mg, Oral, Q4H PRN  morphine injection 2 mg, 2 mg, Intravenous, Q1H PRN **OR** morphine injection 4 mg, 4 mg, Intravenous, Q1H PRN  sennosides-docusate sodium (SENOKOT-S) 8.6-50 MG tablet 1 tablet, 1 tablet, Oral, BID  magnesium hydroxide (MILK OF MAGNESIA) 400 MG/5ML suspension 30 mL, 30 mL, Oral, Daily PRN  aspirin EC tablet 325 mg, 325 mg, Oral, BID  verapamil (CALAN SR) extended release tablet 240 mg, 240 mg, Oral, Nightly    Post-operatively the patients diet was advanced as tolerated and their incision was checked on POD #1. The incision is was clean, dry and intact with no signs of infection. The patient remained neurovascularly intact in the lower extremity and had intact pulses distally. Patients calf remained soft and showed no evidence of DVT. The patient was able to move their leg and ankle/foot without any problems post-operatively.   Physical therapy and occupational therapy were consulted and began working with the patient post-operatively. The patient progressed with PT/OT as would be expected and continued to improve through their stay. The patients pain was initially controlled with IV medications but we were able to transition to oral pain medications soon after arrival to the floor and their pain remained under good control through their hospital stay. From a medical standpoint the patient remained stable and continued to have the medicine team follow throughout their stay. Acute postoperative blood loss anemia after joint replacement being monitored with daily hemoglobin/hematocrit. The patients dressing was changed/incision was checked on day of d/c. The patient will be discharged at this time to  To a non-[facility] facility with their current diet restrictions and will continue to follow the hip precautions outlined to them by us and PT/OT. Patient had a nondisplaced left calcar fracture intraoperatively. Therefore she will be toe-touch weightbearing for 3 weeks. Condition on Discharge: Stable    Plan  Followup at scheduled appointment time (1 month post-op). Patient was instructed on the use of pain medications, the signs and symptoms of infection, and was given our number to call should they have any questions or concerns following discharge.

## 2021-08-14 VITALS
BODY MASS INDEX: 24.45 KG/M2 | DIASTOLIC BLOOD PRESSURE: 64 MMHG | WEIGHT: 138 LBS | SYSTOLIC BLOOD PRESSURE: 134 MMHG | OXYGEN SATURATION: 96 % | HEART RATE: 86 BPM | HEIGHT: 63 IN | RESPIRATION RATE: 16 BRPM | TEMPERATURE: 97.9 F

## 2021-08-14 PROCEDURE — 6370000000 HC RX 637 (ALT 250 FOR IP): Performed by: ORTHOPAEDIC SURGERY

## 2021-08-14 PROCEDURE — G0378 HOSPITAL OBSERVATION PER HR: HCPCS

## 2021-08-14 PROCEDURE — 6370000000 HC RX 637 (ALT 250 FOR IP): Performed by: PHYSICIAN ASSISTANT

## 2021-08-14 PROCEDURE — 2580000003 HC RX 258: Performed by: ORTHOPAEDIC SURGERY

## 2021-08-14 PROCEDURE — 97116 GAIT TRAINING THERAPY: CPT

## 2021-08-14 RX ADMIN — CHOLECALCIFEROL TAB 10 MCG (400 UNIT) 800 UNITS: 10 TAB at 09:00

## 2021-08-14 RX ADMIN — FERROUS SULFATE TAB 325 MG (65 MG ELEMENTAL FE) 325 MG: 325 (65 FE) TAB at 09:00

## 2021-08-14 RX ADMIN — ACETAMINOPHEN 650 MG: 325 TABLET ORAL at 08:59

## 2021-08-14 RX ADMIN — DOCUSATE SODIUM 50 MG AND SENNOSIDES 8.6 MG 1 TABLET: 8.6; 5 TABLET, FILM COATED ORAL at 09:00

## 2021-08-14 RX ADMIN — ASPIRIN 325 MG: 325 TABLET, COATED ORAL at 09:00

## 2021-08-14 RX ADMIN — SODIUM CHLORIDE, PRESERVATIVE FREE 10 ML: 5 INJECTION INTRAVENOUS at 09:01

## 2021-08-14 RX ADMIN — MELOXICAM 15 MG: 7.5 TABLET ORAL at 08:59

## 2021-08-14 ASSESSMENT — PAIN DESCRIPTION - LOCATION: LOCATION: HIP;BACK

## 2021-08-14 ASSESSMENT — PAIN SCALES - WONG BAKER: WONGBAKER_NUMERICALRESPONSE: 4

## 2021-08-14 ASSESSMENT — PAIN DESCRIPTION - PAIN TYPE: TYPE: ACUTE PAIN

## 2021-08-14 ASSESSMENT — PAIN SCALES - GENERAL: PAINLEVEL_OUTOF10: 8

## 2021-08-14 ASSESSMENT — PAIN DESCRIPTION - FREQUENCY: FREQUENCY: INTERMITTENT

## 2021-08-14 ASSESSMENT — PAIN - FUNCTIONAL ASSESSMENT: PAIN_FUNCTIONAL_ASSESSMENT: PREVENTS OR INTERFERES SOME ACTIVE ACTIVITIES AND ADLS

## 2021-08-14 ASSESSMENT — PAIN DESCRIPTION - ORIENTATION: ORIENTATION: LEFT;LOWER

## 2021-08-14 ASSESSMENT — PAIN DESCRIPTION - DESCRIPTORS: DESCRIPTORS: ACHING;SORE

## 2021-08-14 NOTE — PROGRESS NOTES
Subjective:     Post-Operative Day: 4 No complaints. better    Objective:     Patient Vitals for the past 24 hrs:   BP Temp Temp src Pulse Resp SpO2   08/14/21 1107 134/64 97.9 °F (36.6 °C) -- 86 16 96 %   08/14/21 0738 (!) 124/55 98.1 °F (36.7 °C) -- 84 16 95 %   08/14/21 0317 (!) 128/59 97.8 °F (36.6 °C) Temporal 87 18 96 %   08/13/21 2225 (!) 160/63 98.5 °F (36.9 °C) Temporal 92 16 95 %   08/13/21 2024 (!) 148/61 97.5 °F (36.4 °C) Temporal 88 18 95 %       General: Alert cooperative   Wound: Clean dry intact. Moderate swelling   Neurovascular: Exam normal   DVT Exam: negative         Data Review:  Recent Labs     08/12/21  0343 08/13/21  0412   HGB 8.3* 7.7*     Recent Labs     08/13/21  0412      K 4.1   CREATININE 0.7   GLUCOSE 89           Assessment:     Status Post left Total Hip Arthroplasty. Doing well postop without complications .  Small nondisplaced calcar fracture  Plan:   Toe touch weight bearing x 3 weeks  Pain control  Keep accuchecks under 200  PT/OT  DVT prophylaxis  Ice and elevate  Discharge To home or a non-[facility] facility when bed available

## 2021-08-14 NOTE — CARE COORDINATION
Spoke with patient regarding MD orders for [facility] services. Patient agreeable and has chosen [facility]. Referral Faxed. [address] [place] [address] [phone]. [phone]. Please notify [address] [place] [address] when patient discharges and fax DC Summary,  DC med list and any new [facility] orders. The Patient and/or patient representative was provided with a choice of provider and agrees   with the discharge plan. [x] Yes [] No    Freedom of choice list was provided with basic dialogue that supports the patient's individualized plan of care/goals, treatment preferences and shares the quality data associated with the providers.  [x] Yes [] No  Electronically signed by Kacie Mendiola on 8/14/2021 at 3:03 PM

## 2021-08-14 NOTE — CARE COORDINATION
Pt to discharge with [facility], approval for SNF continued to prolong due to insurance, pt requesting [facility] now. High intensity HH is recommended to begin with.  Electronically signed by Gregory Negron on 8/14/2021 at 1:05 PM

## 2021-08-14 NOTE — PROGRESS NOTES
Physical Therapy  Name: Jean Paul Campo  MRN:  [id number]  Date of service:  8/14/2021 08/14/21 1003   Restrictions/Precautions   Restrictions/Precautions Weight Bearing   Lower Extremity Weight Bearing Restrictions   Left Lower Extremity Weight Bearing Toe Touch Weight Bearing   Subjective   Subjective Pt in bed and agreed to therapy. Pain Screening   Patient Currently in Pain Yes   Pain Assessment   Pain Assessment Faces   Pain Type Acute pain   Pain Location Hip;Back   Pain Orientation Left; Lower   Pain Descriptors Aching; Sore   Pain Frequency Intermittent   Functional Pain Assessment Prevents or interferes some active activities and ADLs   Non-Pharmaceutical Pain Intervention(s) Ambulation/Increased Activity;Repositioned; Rest   Response to Pain Intervention Patient Satisfied   Rodrigez-Baker Pain Rating 4   Transfers   Sit to Stand Stand by assistance   Stand to sit Stand by assistance   Comment transferred from [facility] and bed    Ambulation   Ambulation? Yes   Ambulation 1   Surface level tile   Device Rolling Walker   Assistance Contact guard assistance   Quality of Gait cues for TTWB    Gait Deviations Decreased step length; Slow Елена   Distance 35 ft   Comments amb in room   Other Activities   Comment Pt stood at sink x5 min for hygiene SBA. Short term goals   Time Frame for Short term goals 2 WKS   Short term goal 1 SUP<>SIT, MIN A 1   Short term goal 2 SIT<>STAND, MIN A 1   Short term goal 3 TF'S WITH RW, MIN A 1   Conditions Requiring Skilled Therapeutic Intervention   Body structures, Functions, Activity limitations Decreased functional mobility ; Increased pain;Decreased balance;Decreased strength;Decreased endurance   Assessment Pt able to increase amb distance, maintains TTWB with amb but still some cueing for transfers. Pt reported pain in her back from hopping due to TTWB. Pt up in chair with call light in reach.    Activity Tolerance   Activity Tolerance Patient Tolerated treatment well   Safety Devices   Type of devices Left in chair;Call light within reach         Electronically signed by Car Broderick PTA on 8/14/2021 at 10:20 AM

## 2021-08-14 NOTE — DISCHARGE SUMMARY
[facility] [address]  Dr. Deena Montana  Discharge Summary      The Irvine Litten is a 77 y.o. female underwent total hip replacement procedure without complication. Irvine Litten was admitted to the floor following their recovery in the PACU. Discharge Diagnosis  left Hip Replacement    Current Inpatient Medications    Current Facility-Administered Medications: ferrous sulfate (IRON 325) tablet 325 mg, 325 mg, Oral, BID WC  ondansetron (ZOFRAN) injection 4 mg, 4 mg, Intravenous, Q6H PRN  calcium carbonate (TUMS) chewable tablet 500 mg, 500 mg, Oral, TID PRN  meloxicam (MOBIC) tablet 15 mg, 15 mg, Oral, Daily  atorvastatin (LIPITOR) tablet 10 mg, 10 mg, Oral, Nightly  vitamin D3 (CHOLECALCIFEROL) tablet 800 Units, 800 Units, Oral, Daily  sodium chloride flush 0.9 % injection 10 mL, 10 mL, Intravenous, 2 times per day  sodium chloride flush 0.9 % injection 10 mL, 10 mL, Intravenous, PRN  0.9 % sodium chloride infusion, 25 mL, Intravenous, PRN  acetaminophen (TYLENOL) tablet 650 mg, 650 mg, Oral, Q6H  oxyCODONE (ROXICODONE) immediate release tablet 5 mg, 5 mg, Oral, Q4H PRN **OR** oxyCODONE (ROXICODONE) immediate release tablet 10 mg, 10 mg, Oral, Q4H PRN  morphine injection 2 mg, 2 mg, Intravenous, Q1H PRN **OR** morphine injection 4 mg, 4 mg, Intravenous, Q1H PRN  sennosides-docusate sodium (SENOKOT-S) 8.6-50 MG tablet 1 tablet, 1 tablet, Oral, BID  magnesium hydroxide (MILK OF MAGNESIA) 400 MG/5ML suspension 30 mL, 30 mL, Oral, Daily PRN  aspirin EC tablet 325 mg, 325 mg, Oral, BID  verapamil (CALAN SR) extended release tablet 240 mg, 240 mg, Oral, Nightly    Post-operatively the patients diet was advanced as tolerated and their incision was checked on POD #1. The incision is was clean, dry and intact with no signs of infection. The patient remained neurovascularly intact in the lower extremity and had intact pulses distally. Patients calf remained soft and showed no evidence of DVT. The patient was able to move their leg and ankle/foot without any problems post-operatively. Physical therapy and occupational therapy were consulted and began working with the patient post-operatively. The patient progressed with PT/OT as would be expected and continued to improve through their stay. The patients pain was initially controlled with IV medications but we were able to transition to oral pain medications soon after arrival to the floor and their pain remained under good control through their hospital stay. From a medical standpoint the patient remained stable and continued to have the medicine team follow throughout their stay. Acute postoperative blood loss anemia after joint replacement being monitored with daily hemoglobin/hematocrit. The patients dressing was changed/incision was checked on day of d/c. The patient will be discharged at this time to  To a [facility] facility with their current diet restrictions and will continue to follow the hip precautions outlined to them by us and PT/OT. Patient had a nondisplaced left calcar fracture intraoperatively. Therefore she will be toe-touch weightbearing for 3 weeks. Condition on Discharge: Stable    Plan  Followup at scheduled appointment time (1 month post-op). Patient was instructed on the use of pain medications, the signs and symptoms of infection, and was given our number to call should they have any questions or concerns following discharge.

## 2021-08-17 ENCOUNTER — TELEPHONE (OUTPATIENT)
Dept: INPATIENT UNIT | Age: 76
End: 2021-08-17

## 2021-08-17 NOTE — TELEPHONE ENCOUNTER
Follow up phone call x 1 attempt. Left a message. Expecting a return call.   Electronically signed by Eliecer Reynolds RN on 8/17/2021 at 11:54 AM

## 2021-08-19 ENCOUNTER — APPOINTMENT (OUTPATIENT)
Dept: GENERAL RADIOLOGY | Age: 76
End: 2021-08-19
Payer: MEDICARE

## 2021-08-19 ENCOUNTER — HOSPITAL ENCOUNTER (OUTPATIENT)
Age: 76
Setting detail: OBSERVATION
Discharge: HOME HEALTH | End: 2021-08-21
Attending: EMERGENCY MEDICINE | Admitting: FAMILY MEDICINE
Payer: MEDICARE

## 2021-08-19 DIAGNOSIS — M25.552 LEFT HIP PAIN: ICD-10-CM

## 2021-08-19 DIAGNOSIS — G89.18 POST-OPERATIVE PAIN: Primary | ICD-10-CM

## 2021-08-19 LAB
ANION GAP SERPL CALCULATED.3IONS-SCNC: 10 MMOL/L (ref 7–19)
BASOPHILS ABSOLUTE: 0 K/UL (ref 0–0.2)
BASOPHILS RELATIVE PERCENT: 0.2 % (ref 0–1)
BILIRUBIN URINE: NEGATIVE
BLOOD, URINE: NEGATIVE
BUN BLDV-MCNC: 11 MG/DL (ref 8–23)
CALCIUM SERPL-MCNC: 8.7 MG/DL (ref 8.8–10.2)
CHLORIDE BLD-SCNC: 100 MMOL/L (ref 98–111)
CLARITY: CLEAR
CO2: 25 MMOL/L (ref 22–29)
COLOR: YELLOW
CREAT SERPL-MCNC: 0.6 MG/DL (ref 0.5–0.9)
EOSINOPHILS ABSOLUTE: 0.2 K/UL (ref 0–0.6)
EOSINOPHILS RELATIVE PERCENT: 2.3 % (ref 0–5)
GFR AFRICAN AMERICAN: >59
GFR NON-AFRICAN AMERICAN: >60
GLUCOSE BLD-MCNC: 112 MG/DL (ref 74–109)
GLUCOSE URINE: NEGATIVE MG/DL
HCT VFR BLD CALC: 27.6 % (ref 37–47)
HEMOGLOBIN: 8.5 G/DL (ref 12–16)
IMMATURE GRANULOCYTES #: 0.3 K/UL
KETONES, URINE: NEGATIVE MG/DL
LEUKOCYTE ESTERASE, URINE: NEGATIVE
LYMPHOCYTES ABSOLUTE: 2 K/UL (ref 1.1–4.5)
LYMPHOCYTES RELATIVE PERCENT: 22.5 % (ref 20–40)
MCH RBC QN AUTO: 30 PG (ref 27–31)
MCHC RBC AUTO-ENTMCNC: 30.8 G/DL (ref 33–37)
MCV RBC AUTO: 97.5 FL (ref 81–99)
MONOCYTES ABSOLUTE: 0.9 K/UL (ref 0–0.9)
MONOCYTES RELATIVE PERCENT: 9.9 % (ref 0–10)
NEUTROPHILS ABSOLUTE: 5.5 K/UL (ref 1.5–7.5)
NEUTROPHILS RELATIVE PERCENT: 61.9 % (ref 50–65)
NITRITE, URINE: NEGATIVE
PDW BLD-RTO: 13.1 % (ref 11.5–14.5)
PH UA: 8 (ref 5–8)
PLATELET # BLD: 417 K/UL (ref 130–400)
PMV BLD AUTO: 8.6 FL (ref 9.4–12.3)
POTASSIUM SERPL-SCNC: 4 MMOL/L (ref 3.5–5)
PROTEIN UA: NEGATIVE MG/DL
RBC # BLD: 2.83 M/UL (ref 4.2–5.4)
SARS-COV-2, NAAT: NOT DETECTED
SODIUM BLD-SCNC: 135 MMOL/L (ref 136–145)
SPECIFIC GRAVITY UA: 1.01 (ref 1–1.03)
UROBILINOGEN, URINE: 1 E.U./DL
WBC # BLD: 8.8 K/UL (ref 4.8–10.8)

## 2021-08-19 PROCEDURE — 73552 X-RAY EXAM OF FEMUR 2/>: CPT

## 2021-08-19 PROCEDURE — G0378 HOSPITAL OBSERVATION PER HR: HCPCS

## 2021-08-19 PROCEDURE — 99284 EMERGENCY DEPT VISIT MOD MDM: CPT

## 2021-08-19 PROCEDURE — 96375 TX/PRO/DX INJ NEW DRUG ADDON: CPT

## 2021-08-19 PROCEDURE — 36415 COLL VENOUS BLD VENIPUNCTURE: CPT

## 2021-08-19 PROCEDURE — 81003 URINALYSIS AUTO W/O SCOPE: CPT

## 2021-08-19 PROCEDURE — 6360000002 HC RX W HCPCS: Performed by: FAMILY MEDICINE

## 2021-08-19 PROCEDURE — 6360000002 HC RX W HCPCS: Performed by: EMERGENCY MEDICINE

## 2021-08-19 PROCEDURE — 72170 X-RAY EXAM OF PELVIS: CPT

## 2021-08-19 PROCEDURE — 96376 TX/PRO/DX INJ SAME DRUG ADON: CPT

## 2021-08-19 PROCEDURE — 85025 COMPLETE CBC W/AUTO DIFF WBC: CPT

## 2021-08-19 PROCEDURE — 87635 SARS-COV-2 COVID-19 AMP PRB: CPT

## 2021-08-19 PROCEDURE — 96374 THER/PROPH/DIAG INJ IV PUSH: CPT

## 2021-08-19 PROCEDURE — 2580000003 HC RX 258: Performed by: FAMILY MEDICINE

## 2021-08-19 PROCEDURE — 6370000000 HC RX 637 (ALT 250 FOR IP): Performed by: FAMILY MEDICINE

## 2021-08-19 PROCEDURE — 80048 BASIC METABOLIC PNL TOTAL CA: CPT

## 2021-08-19 RX ORDER — HYDROMORPHONE HYDROCHLORIDE 1 MG/ML
0.25 INJECTION, SOLUTION INTRAMUSCULAR; INTRAVENOUS; SUBCUTANEOUS
Status: DISCONTINUED | OUTPATIENT
Start: 2021-08-19 | End: 2021-08-21 | Stop reason: HOSPADM

## 2021-08-19 RX ORDER — ASPIRIN 81 MG/1
81 TABLET ORAL 2 TIMES DAILY
Status: DISCONTINUED | OUTPATIENT
Start: 2021-08-19 | End: 2021-08-21 | Stop reason: HOSPADM

## 2021-08-19 RX ORDER — HYDROMORPHONE HYDROCHLORIDE 1 MG/ML
0.5 INJECTION, SOLUTION INTRAMUSCULAR; INTRAVENOUS; SUBCUTANEOUS ONCE
Status: COMPLETED | OUTPATIENT
Start: 2021-08-19 | End: 2021-08-19

## 2021-08-19 RX ORDER — ORPHENADRINE CITRATE 30 MG/ML
60 INJECTION INTRAMUSCULAR; INTRAVENOUS ONCE
Status: COMPLETED | OUTPATIENT
Start: 2021-08-19 | End: 2021-08-19

## 2021-08-19 RX ORDER — SODIUM CHLORIDE 0.9 % (FLUSH) 0.9 %
5-40 SYRINGE (ML) INJECTION PRN
Status: DISCONTINUED | OUTPATIENT
Start: 2021-08-19 | End: 2021-08-21 | Stop reason: HOSPADM

## 2021-08-19 RX ORDER — MELOXICAM 7.5 MG/1
15 TABLET ORAL DAILY
Status: DISCONTINUED | OUTPATIENT
Start: 2021-08-20 | End: 2021-08-21 | Stop reason: HOSPADM

## 2021-08-19 RX ORDER — ONDANSETRON 2 MG/ML
4 INJECTION INTRAMUSCULAR; INTRAVENOUS EVERY 6 HOURS PRN
Status: DISCONTINUED | OUTPATIENT
Start: 2021-08-19 | End: 2021-08-21 | Stop reason: HOSPADM

## 2021-08-19 RX ORDER — ACETAMINOPHEN 325 MG/1
650 TABLET ORAL EVERY 4 HOURS PRN
Status: DISCONTINUED | OUTPATIENT
Start: 2021-08-19 | End: 2021-08-21 | Stop reason: HOSPADM

## 2021-08-19 RX ORDER — VERAPAMIL HYDROCHLORIDE 240 MG/1
240 TABLET, FILM COATED, EXTENDED RELEASE ORAL NIGHTLY
Status: DISCONTINUED | OUTPATIENT
Start: 2021-08-19 | End: 2021-08-21 | Stop reason: HOSPADM

## 2021-08-19 RX ORDER — HYDROMORPHONE HYDROCHLORIDE 1 MG/ML
0.5 INJECTION, SOLUTION INTRAMUSCULAR; INTRAVENOUS; SUBCUTANEOUS
Status: DISCONTINUED | OUTPATIENT
Start: 2021-08-19 | End: 2021-08-21 | Stop reason: HOSPADM

## 2021-08-19 RX ORDER — SODIUM CHLORIDE 9 MG/ML
25 INJECTION, SOLUTION INTRAVENOUS PRN
Status: DISCONTINUED | OUTPATIENT
Start: 2021-08-19 | End: 2021-08-21 | Stop reason: HOSPADM

## 2021-08-19 RX ORDER — ATORVASTATIN CALCIUM 20 MG/1
20 TABLET, FILM COATED ORAL NIGHTLY
Status: DISCONTINUED | OUTPATIENT
Start: 2021-08-19 | End: 2021-08-21 | Stop reason: HOSPADM

## 2021-08-19 RX ORDER — SODIUM CHLORIDE 0.9 % (FLUSH) 0.9 %
5-40 SYRINGE (ML) INJECTION EVERY 12 HOURS SCHEDULED
Status: DISCONTINUED | OUTPATIENT
Start: 2021-08-19 | End: 2021-08-21 | Stop reason: HOSPADM

## 2021-08-19 RX ADMIN — ASPIRIN 81 MG: 81 TABLET, COATED ORAL at 23:27

## 2021-08-19 RX ADMIN — Medication 1 TABLET: at 23:27

## 2021-08-19 RX ADMIN — SODIUM CHLORIDE, PRESERVATIVE FREE 10 ML: 5 INJECTION INTRAVENOUS at 23:31

## 2021-08-19 RX ADMIN — ATORVASTATIN CALCIUM 20 MG: 20 TABLET, FILM COATED ORAL at 23:27

## 2021-08-19 RX ADMIN — HYDROMORPHONE HYDROCHLORIDE 0.5 MG: 1 INJECTION, SOLUTION INTRAMUSCULAR; INTRAVENOUS; SUBCUTANEOUS at 19:26

## 2021-08-19 RX ADMIN — HYDROMORPHONE HYDROCHLORIDE 0.5 MG: 1 INJECTION, SOLUTION INTRAMUSCULAR; INTRAVENOUS; SUBCUTANEOUS at 23:31

## 2021-08-19 RX ADMIN — ORPHENADRINE CITRATE 60 MG: 30 INJECTION INTRAMUSCULAR; INTRAVENOUS at 19:26

## 2021-08-19 RX ADMIN — VERAPAMIL HYDROCHLORIDE 240 MG: 240 TABLET, FILM COATED, EXTENDED RELEASE ORAL at 23:27

## 2021-08-19 ASSESSMENT — ENCOUNTER SYMPTOMS
NAUSEA: 0
BACK PAIN: 0
ABDOMINAL PAIN: 0
VOMITING: 0
DIARRHEA: 0
SHORTNESS OF BREATH: 0

## 2021-08-19 ASSESSMENT — PAIN DESCRIPTION - PAIN TYPE
TYPE: ACUTE PAIN
TYPE: ACUTE PAIN

## 2021-08-19 ASSESSMENT — PAIN SCALES - GENERAL
PAINLEVEL_OUTOF10: 4
PAINLEVEL_OUTOF10: 7
PAINLEVEL_OUTOF10: 8

## 2021-08-19 ASSESSMENT — PAIN DESCRIPTION - LOCATION
LOCATION: HIP
LOCATION: LEG

## 2021-08-19 ASSESSMENT — PAIN DESCRIPTION - DESCRIPTORS: DESCRIPTORS: ACHING

## 2021-08-19 ASSESSMENT — PAIN DESCRIPTION - ORIENTATION
ORIENTATION: LEFT
ORIENTATION: LEFT

## 2021-08-19 ASSESSMENT — PAIN DESCRIPTION - FREQUENCY: FREQUENCY: CONTINUOUS

## 2021-08-19 NOTE — ED PROVIDER NOTES
[facility] EMERGENCY DEPT  eMERGENCY dEPARTMENT eNCOUnter      Pt Name: Jessie Broderick  MRN: [id number]  [place] 1945  Date of evaluation: 8/19/2021  Provider: Saloni Ramirez MD    [address]       Chief Complaint   Patient presents with    Post-op Problem     pain to left leg and hip. recent surgery to that side         HISTORY OF PRESENT ILLNESS   (Location/Symptom, Timing/Onset,Context/Setting, Quality, Duration, Modifying Factors, Severity)  Note limiting factors. Jessie Broderick is a 77 y.o. female who presents to the emergency department for worsening left hip pain. Patient states she had a left hip replacement by Dr. Celestine Mckeon 9 days ago and supposedly was somewhat complicated because there was an associated fracture and told recovery could take some extra time. Patient has not fallen. She has been using pain meds at home and called ortho office today and told ok to double the dose which she did twice but still not helping. She tells me she cant really walk at this point with her walker. Lives at home with daughter and . Wanted to go to rehab but couldn't due to insurance issue. HPI    NursingNotes were reviewed. REVIEW OF SYSTEMS    (2-9 systems for level 4, 10 or more for level 5)     Review of Systems   Constitutional: Positive for fatigue. Negative for chills and fever. Respiratory: Negative for shortness of breath. Cardiovascular: Negative for chest pain. Gastrointestinal: Negative for abdominal pain, diarrhea, nausea and vomiting. Genitourinary: Negative for dysuria, flank pain and frequency. Musculoskeletal: Positive for gait problem. Negative for back pain. Skin: Negative for rash. Neurological: Negative for dizziness and headaches. All other systems reviewed and are negative.            PAST MEDICALHISTORY     Past Medical History:   Diagnosis Date    Arthritis     Hyperlipidemia     Hypertension          SURGICAL HISTORY       Past Surgical History:   Procedure Laterality Date    APPENDECTOMY      HYSTERECTOMY      TOTAL HIP ARTHROPLASTY Left 8/10/2021    LEFT TOTAL HIP ARTHROPLASTY ANTERIOR APPROACH performed by Fareed Woodson MD at [address]     Previous Medications    ASPIRIN EC 81 MG EC TABLET    Take 1 tablet by mouth 2 times daily    BIOTIN 10 MG CAPS    Take 1 tablet by mouth daily    BORON PO    Take 1 mg by mouth daily    CALCIUM CARBONATE (OSCAL) 500 MG TABS TABLET    Take 500 mg by mouth daily    ESTROGENS, CONJUGATED, (PREMARIN) 0.3 MG TABLET    Take 0.3 mg by mouth daily    MAGNESIUM HYDROXIDE (MAGNESIA PO)    Take 80 mg by mouth daily    MELOXICAM (MOBIC) 15 MG TABLET    Take 15 mg by mouth daily    PYRIDOXINE HCL (VITAMIN B6 PO)    Take 10 mg by mouth daily    SIMVASTATIN (ZOCOR) 20 MG TABLET    Take 20 mg by mouth nightly    VERAPAMIL (VERELAN) 240 MG EXTENDED RELEASE CAPSULE    Take 240 mg by mouth nightly    VITAMIN D3 (CHOLECALCIFEROL) 10 MCG (400 UNIT) TABS TABLET    Take 800 Units by mouth daily       ALLERGIES     Patient has no known allergies. FAMILY HISTORY     No family history on file.        SOCIAL HISTORY       Social History     Socioeconomic History    Marital status:      Spouse name: Not on file    Number of children: Not on file    Years of education: Not on file    Highest education level: Not on file   Occupational History    Not on file   Tobacco Use    Smoking status: Never Smoker    Smokeless tobacco: Never Used   Substance and Sexual Activity    Alcohol use: Yes     Comment: occasional    Drug use: Not on file    Sexual activity: Not on file   Other Topics Concern    Not on file   Social History Narrative    Not on file     Social Determinants of Health     Financial Resource Strain:     Difficulty of Paying Living Expenses:    Food Insecurity:     Worried About Running Out of Food in the Last Year:     [address] in the Last Year:    Transportation Needs:     Lack of Transportation (Medical):  Lack of Transportation (Non-Medical):    Physical Activity:     Days of Exercise per Week:     Minutes of Exercise per Session:    Stress:     Feeling of Stress :    Social Connections:     Frequency of Communication with Friends and Family:     Frequency of Social Gatherings with Friends and Family:     Attends [employer] Services:     Active Member of Clubs or Organizations:     Attends Club or Organization Meetings:     Marital Status:    Intimate Partner Violence:     Fear of Current or Ex-Partner:     Emotionally Abused:     Physically Abused:     Sexually Abused:        SCREENINGS             PHYSICAL EXAM    (up to 7 for level 4, 8 or more for level 5)     ED Triage Vitals   BP Temp Temp src Pulse Resp SpO2 Height Weight   08/19/21 1759 08/19/21 1758 -- 08/19/21 1758 08/19/21 1758 08/19/21 1759 08/19/21 1758 08/19/21 1758   (!) 143/51 98 °F (36.7 °C)  89 18 92 % 5' 3\" (1.6 m) 138 lb (62.6 kg)       Physical Exam  Vitals and nursing note reviewed. Constitutional:       General: She is not in acute distress. Appearance: Normal appearance. She is well-developed. She is not ill-appearing or diaphoretic. HENT:      Head: Normocephalic and atraumatic. Right Ear: External ear normal.      Left Ear: External ear normal.   Eyes:      Conjunctiva/sclera: Conjunctivae normal.   Neck:      Trachea: No tracheal deviation. Cardiovascular:      Rate and Rhythm: Normal rate and regular rhythm. Pulses: Normal pulses. Heart sounds: Normal heart sounds. No murmur heard. Pulmonary:      Effort: No respiratory distress. Breath sounds: Normal breath sounds. No wheezing or rales. Abdominal:      Palpations: Abdomen is soft. There is no mass. Tenderness: There is no abdominal tenderness. Musculoskeletal:         General: Normal range of motion. Cervical back: Normal range of motion.       Comments: Left leg mild edema no erythema well healing thigh incision c/d/i, dec ROM and pain     Seems to have most pain on posterior hip region   Skin:     General: Skin is warm and dry. Neurological:      Mental Status: She is alert and oriented to person, place, and time. GCS: GCS eye subscore is 4. GCS verbal subscore is 5. GCS motor subscore is 6. DIAGNOSTIC RESULTS         RADIOLOGY:  Non-plain film images such as CT, Ultrasound and MRI are read by the radiologist. Plain radiographic images are visualized and preliminarily interpreted bythe emergency physician with the below findings:          XR PELVIS (1-2 VIEWS)   Final Result   1. No acute bony abnormality. Signed by Dr Hever Strauss      XR FEMUR LEFT (MIN 2 VIEWS)   Final Result   1. No acute bony abnormality. Signed by Dr Noelle Cottrell:  Labs Reviewed   CBC WITH AUTO DIFFERENTIAL - Abnormal; Notable for the following components:       Result Value    RBC 2.83 (*)     Hemoglobin 8.5 (*)     Hematocrit 27.6 (*)     MCHC 30.8 (*)     Platelets 323 (*)     MPV 8.6 (*)     All other components within normal limits   BASIC METABOLIC PANEL - Abnormal; Notable for the following components:    Sodium 135 (*)     Glucose 112 (*)     Calcium 8.7 (*)     All other components within normal limits   COVID-19, RAPID   URINE RT REFLEX TO CULTURE       All other labs were within normal range or not returned as of this dictation.     EMERGENCY DEPARTMENT COURSE and DIFFERENTIAL DIAGNOSIS/MDM:   Vitals:    Vitals:    08/19/21 1758 08/19/21 1759   BP:  (!) 143/51   Pulse: 89    Resp: 18    Temp: 98 °F (36.7 °C)    SpO2:  92%   Weight: 138 lb (62.6 kg)    Height: 5' 3\" (1.6 m)        MDM    Pt has continued to decline at home to point where she cant really walk and family not able to help her, d/w Dr. Mariah Alexander for admission for pain control, pt likely going to need placement/rehab ultimately    CONSULTS:  None    PROCEDURES:  Unless otherwise noted below, none Procedures    FINAL IMPRESSION      1. Post-operative pain    2.  Left hip pain          DISPOSITION/PLAN   DISPOSITION Admitted 08/19/2021 09:09:27 PM      PATIENT REFERRED TO:  Isabel Ca, [address]  [phone]    Call in 1 day        DISCHARGE MEDICATIONS:  New Prescriptions    No medications on file          (Please note that portions of this note were completed with a voice recognition program.  Efforts were made to edit thedictations but occasionally words are mis-transcribed.)    Jay Mccormick MD (electronically signed)  Attending Emergency Physician        Shiva Oviedo MD  08/19/21 8449

## 2021-08-20 ENCOUNTER — APPOINTMENT (OUTPATIENT)
Dept: GENERAL RADIOLOGY | Age: 76
End: 2021-08-20
Payer: MEDICARE

## 2021-08-20 PROBLEM — D64.9 POSTOPERATIVE ANEMIA: Status: ACTIVE | Noted: 2021-08-20

## 2021-08-20 PROBLEM — I10 ESSENTIAL HYPERTENSION: Status: ACTIVE | Noted: 2021-08-20

## 2021-08-20 PROBLEM — R26.81 UNSTABLE GAIT: Status: ACTIVE | Noted: 2021-08-20

## 2021-08-20 PROCEDURE — 6360000002 HC RX W HCPCS: Performed by: FAMILY MEDICINE

## 2021-08-20 PROCEDURE — 72100 X-RAY EXAM L-S SPINE 2/3 VWS: CPT

## 2021-08-20 PROCEDURE — 97161 PT EVAL LOW COMPLEX 20 MIN: CPT

## 2021-08-20 PROCEDURE — 6360000002 HC RX W HCPCS: Performed by: ORTHOPAEDIC SURGERY

## 2021-08-20 PROCEDURE — 6370000000 HC RX 637 (ALT 250 FOR IP): Performed by: ORTHOPAEDIC SURGERY

## 2021-08-20 PROCEDURE — 2580000003 HC RX 258: Performed by: FAMILY MEDICINE

## 2021-08-20 PROCEDURE — G0378 HOSPITAL OBSERVATION PER HR: HCPCS

## 2021-08-20 PROCEDURE — 97116 GAIT TRAINING THERAPY: CPT

## 2021-08-20 PROCEDURE — 96376 TX/PRO/DX INJ SAME DRUG ADON: CPT

## 2021-08-20 PROCEDURE — 6370000000 HC RX 637 (ALT 250 FOR IP): Performed by: FAMILY MEDICINE

## 2021-08-20 RX ORDER — METHYLPREDNISOLONE 4 MG/1
4 TABLET ORAL SEE ADMIN INSTRUCTIONS
Status: DISCONTINUED | OUTPATIENT
Start: 2021-08-20 | End: 2021-08-20 | Stop reason: SDUPTHER

## 2021-08-20 RX ORDER — METHYLPREDNISOLONE 4 MG/1
4 TABLET ORAL
Status: DISCONTINUED | OUTPATIENT
Start: 2021-08-21 | End: 2021-08-21 | Stop reason: HOSPADM

## 2021-08-20 RX ORDER — OXYCODONE HYDROCHLORIDE AND ACETAMINOPHEN 5; 325 MG/1; MG/1
1 TABLET ORAL EVERY 4 HOURS PRN
Status: DISCONTINUED | OUTPATIENT
Start: 2021-08-20 | End: 2021-08-21 | Stop reason: HOSPADM

## 2021-08-20 RX ORDER — METHYLPREDNISOLONE 4 MG/1
8 TABLET ORAL NIGHTLY
Status: DISCONTINUED | OUTPATIENT
Start: 2021-08-21 | End: 2021-08-21 | Stop reason: HOSPADM

## 2021-08-20 RX ORDER — METHYLPREDNISOLONE 4 MG/1
24 TABLET ORAL ONCE
Status: COMPLETED | OUTPATIENT
Start: 2021-08-20 | End: 2021-08-20

## 2021-08-20 RX ORDER — KETOROLAC TROMETHAMINE 30 MG/ML
30 INJECTION, SOLUTION INTRAMUSCULAR; INTRAVENOUS EVERY 6 HOURS PRN
Status: ACTIVE | OUTPATIENT
Start: 2021-08-20 | End: 2021-08-21

## 2021-08-20 RX ORDER — METHYLPREDNISOLONE 4 MG/1
4 TABLET ORAL NIGHTLY
Status: DISCONTINUED | OUTPATIENT
Start: 2021-08-22 | End: 2021-08-21 | Stop reason: HOSPADM

## 2021-08-20 RX ADMIN — ATORVASTATIN CALCIUM 20 MG: 20 TABLET, FILM COATED ORAL at 19:25

## 2021-08-20 RX ADMIN — MELOXICAM 15 MG: 7.5 TABLET ORAL at 08:18

## 2021-08-20 RX ADMIN — SODIUM CHLORIDE, PRESERVATIVE FREE 10 ML: 5 INJECTION INTRAVENOUS at 19:25

## 2021-08-20 RX ADMIN — VERAPAMIL HYDROCHLORIDE 240 MG: 240 TABLET, FILM COATED, EXTENDED RELEASE ORAL at 19:25

## 2021-08-20 RX ADMIN — OXYCODONE HYDROCHLORIDE AND ACETAMINOPHEN 1 TABLET: 5; 325 TABLET ORAL at 08:16

## 2021-08-20 RX ADMIN — Medication 1 TABLET: at 08:16

## 2021-08-20 RX ADMIN — HYDROMORPHONE HYDROCHLORIDE 0.5 MG: 1 INJECTION, SOLUTION INTRAMUSCULAR; INTRAVENOUS; SUBCUTANEOUS at 03:46

## 2021-08-20 RX ADMIN — METHYLPREDNISOLONE 24 MG: 4 TABLET ORAL at 08:16

## 2021-08-20 RX ADMIN — ASPIRIN 81 MG: 81 TABLET, COATED ORAL at 08:16

## 2021-08-20 RX ADMIN — ASPIRIN 81 MG: 81 TABLET, COATED ORAL at 18:30

## 2021-08-20 RX ADMIN — SODIUM CHLORIDE, PRESERVATIVE FREE 10 ML: 5 INJECTION INTRAVENOUS at 08:18

## 2021-08-20 ASSESSMENT — PAIN DESCRIPTION - ORIENTATION
ORIENTATION: LEFT;LOWER
ORIENTATION: LEFT

## 2021-08-20 ASSESSMENT — PAIN - FUNCTIONAL ASSESSMENT: PAIN_FUNCTIONAL_ASSESSMENT: ACTIVITIES ARE NOT PREVENTED

## 2021-08-20 ASSESSMENT — PAIN DESCRIPTION - DESCRIPTORS
DESCRIPTORS: ACHING
DESCRIPTORS: ACHING

## 2021-08-20 ASSESSMENT — PAIN DESCRIPTION - FREQUENCY
FREQUENCY: CONTINUOUS
FREQUENCY: CONTINUOUS

## 2021-08-20 ASSESSMENT — PAIN SCALES - GENERAL
PAINLEVEL_OUTOF10: 7
PAINLEVEL_OUTOF10: 6
PAINLEVEL_OUTOF10: 8
PAINLEVEL_OUTOF10: 0

## 2021-08-20 ASSESSMENT — PAIN DESCRIPTION - ONSET: ONSET: ON-GOING

## 2021-08-20 ASSESSMENT — PAIN DESCRIPTION - LOCATION
LOCATION: HIP
LOCATION: BACK

## 2021-08-20 ASSESSMENT — PAIN DESCRIPTION - PAIN TYPE
TYPE: ACUTE PAIN
TYPE: ACUTE PAIN

## 2021-08-20 NOTE — CARE COORDINATION
Patient is current with [address] [address] for PT/OTServices. Will follow. Please advise when patient discharges. WILL NEED RESUMPTION OF CARE ORDERS TO RESUME HH SERVICES WHEN PATIENT DISCHARGES. Thank you. [address] [place] [address] [phone]. [phone].     Thai Sims RN, Home Care Liaison  [phone] P  Electronically signed by Thai iSms on 8/20/2021 at 8:38 AM

## 2021-08-20 NOTE — CONSULTS
Orthopaedic Inpatient Consultation    NAME:  Nikos Loera   : 1945  MRN: [id number]    2021  5:56 PM        CHIEF COMPLAINT:  left sided low back pain. HISTORY OF PRESENT ILLNESS:   The patient is a 77 y.o. female status post left total hip arthroplasty August 10 who presents with the a complaint for the last 2 days. There was no injury she did not fall. She had a nondisplaced calcar fracture had surgery and recommended toe-touch weightbearing. She was slow to recover last week. We thought she was going to go to rehab but they had not approved it over the weekend so she decided to go home. She went to the ER last night with this pain in her left lower back area. She was evaluated in considered ready to go home but they could not get a hold of her  to take her home. She was admitted for evaluation. Patient has had problems with her back in the past she saw a physician 10 years ago and got a Toradol injection that resolved it. She has had chronic off and on low back pain worse on her left side. Hip feels okay not having much pain there except in her anterior and lateral thigh area. She has no back pain at rest.  No dysuria. No foul-smelling urine. Past Medical History:        Diagnosis Date    Arthritis     Hyperlipidemia     Hypertension        Past Surgical History:        Procedure Laterality Date    APPENDECTOMY      HYSTERECTOMY      TOTAL HIP ARTHROPLASTY Left 8/10/2021    LEFT TOTAL HIP ARTHROPLASTY ANTERIOR APPROACH performed by Braxton Engle MD at [address] [place] [address]       Current Medications:   Prior to Admission medications    Medication Sig Start Date End Date Taking?  Authorizing Provider   aspirin EC 81 MG EC tablet Take 1 tablet by mouth 2 times daily 21  Yes Braxton Engle MD   estrogens, conjugated, (PREMARIN) 0.3 MG tablet Take 0.3 mg by mouth daily   Yes Historical Provider, MD   verapamil (VERELAN) 240 MG extended release capsule Take 240 mg by mouth nightly   Yes Historical Provider, MD   simvastatin (ZOCOR) 20 MG tablet Take 20 mg by mouth nightly   Yes Historical Provider, MD   calcium carbonate (OSCAL) 500 MG TABS tablet Take 500 mg by mouth daily   Yes Historical Provider, MD   Magnesium Hydroxide (MAGNESIA PO) Take 80 mg by mouth daily   Yes Historical Provider, MD   meloxicam (MOBIC) 15 MG tablet Take 15 mg by mouth daily   Yes Historical Provider, MD   Biotin 10 MG CAPS Take 1 tablet by mouth daily    Historical Provider, MD   vitamin D3 (CHOLECALCIFEROL) 10 MCG (400 UNIT) TABS tablet Take 800 Units by mouth daily    Historical Provider, MD   Pyridoxine HCl (VITAMIN B6 PO) Take 10 mg by mouth daily    Historical Provider, MD   BORON PO Take 1 mg by mouth daily    Historical Provider, MD       Allergies:  Patient has no known allergies. Social History:   Social History     Socioeconomic History    Marital status:      Spouse name: Not on file    Number of children: Not on file    Years of education: Not on file    Highest education level: Not on file   Occupational History    Not on file   Tobacco Use    Smoking status: Never Smoker    Smokeless tobacco: Never Used   Substance and Sexual Activity    Alcohol use: Yes     Comment: occasional    Drug use: Not on file    Sexual activity: Not on file   Other Topics Concern    Not on file   Social History Narrative    Not on file     Social Determinants of Health     Financial Resource Strain:     Difficulty of Paying Living Expenses:    Food Insecurity:     Worried About Running Out of Food in the Last Year:     [address] in the Last Year:    Transportation Needs:     Lack of Transportation (Medical):      Lack of Transportation (Non-Medical):    Physical Activity:     Days of Exercise per Week:     Minutes of Exercise per Session:    Stress:     Feeling of Stress :    Social Connections:     Frequency of Communication with Friends and Family:     Frequency of Social Gatherings with Friends and Family:     Attends Holiness Services:     Active Member of Clubs or Organizations:     Attends Club or Organization Meetings:     Marital Status:    Intimate Partner Violence:     Fear of Current or Ex-Partner:     Emotionally Abused:     Physically Abused:     Sexually Abused:        Family History:   History reviewed. No pertinent family history. REVIEW OF SYSTEMS:  14 point review of systems has been reviewed from the patient's emergency room visit, reviewed with the patient on today's date with no new changes. PHYSICAL EXAM:      Physical Examination:  Vitals:   Vitals:    08/19/21 2145 08/20/21 0001 08/20/21 0538 08/20/21 0704   BP: (!) 119/52 (!) 144/55 (!) 123/53 (!) 150/62   Pulse: 82 100 80 82   Resp: 20 18 17 18   Temp: 98.1 °F (36.7 °C) 97.4 °F (36.3 °C) 97.4 °F (36.3 °C) 98.3 °F (36.8 °C)   TempSrc: Temporal Temporal Temporal Temporal   SpO2: 95% 96% 97% 99%   Weight:       Height:         General:  Appears stated age, no distress. Crying as she gives her history. Orientation:  Alert and oriented to time, place, and person. Mood and Affect:  Cooperative and pleasant. Gait:  Resting comfortably in bed. Cardiovascular:  Symmetric 1-2 plus pulses in upper and lower extremities. Lymph:  No cervical or inguinal lymphadenopathy noted. Sensation:  Grossly intact to light touch. DTR:  Normal, no pathologic reflexes. Coordination/balance:  Normal    Musculoskeletal:  Right upper extremity exam:  There is no tenderness to palpation about the shoulder, elbow, wrist or hand. Range of motion normal  .  5/5 strength, normal sensation, good radial pulse and skin is normal.      Left upper extremity exam:  There is no tenderness to palpation about the shoulder, elbow, wrist or hand.  Range of motion normal .   5/5 strength, normal sensation, good radial pulse and skin is normal.     Right lower extremity exam:  There is no tenderness to palpation about the hip, knee, ankle or foot. Range of motion normal .  5/5 strength, normal sensation, good dorsalis pedis pulse  and skin is normal.     Left lower extremity exam:  There is  tenderness to palpation about the hip incision, but not the knee, ankle or foot. Range of motion normal .   5/5 strength normal sensation, good dorsalis pedis pulse and skin is normal.  Minimal pain with gentle internal and external rotation of the left hip. Her incision is well approximated with Prineo. No redness. Equal leg lengths. Screams out in pain when I palpate her lower lumbar spine worse on the left side. No costovertebral angle tenderness. DATA:    CBC with Differential:    Lab Results   Component Value Date    WBC 8.8 08/19/2021    RBC 2.83 08/19/2021    HGB 8.5 08/19/2021    HCT 27.6 08/19/2021     08/19/2021    MCV 97.5 08/19/2021    MCH 30.0 08/19/2021    MCHC 30.8 08/19/2021    RDW 13.1 08/19/2021    LYMPHOPCT 22.5 08/19/2021    MONOPCT 9.9 08/19/2021    BASOPCT 0.2 08/19/2021    MONOSABS 0.90 08/19/2021    LYMPHSABS 2.0 08/19/2021    EOSABS 0.20 08/19/2021    BASOSABS 0.00 08/19/2021     CMP:    Lab Results   Component Value Date     08/19/2021    K 4.0 08/19/2021    K 4.1 08/13/2021     08/19/2021    CO2 25 08/19/2021    BUN 11 08/19/2021    CREATININE 0.6 08/19/2021    GFRAA >59 08/19/2021    LABGLOM >60 08/19/2021    GLUCOSE 112 08/19/2021    CALCIUM 8.7 08/19/2021     BMP:    Lab Results   Component Value Date     08/19/2021    K 4.0 08/19/2021    K 4.1 08/13/2021     08/19/2021    CO2 25 08/19/2021    BUN 11 08/19/2021    CREATININE 0.6 08/19/2021    CALCIUM 8.7 08/19/2021    GFRAA >59 08/19/2021    LABGLOM >60 08/19/2021    GLUCOSE 112 08/19/2021         Radiology: XR PELVIS (1-2 VIEWS)    Result Date: 8/19/2021  XR PELVIS (1-2 VIEWS) 8/19/2021 8:17 PM History: Pelvis and left hip pain. Pelvis, one view. Appearance of the left hip prosthesis. Intact right hip. Intact pelvic ring.  SI joints are symmetric. Moderate degenerative change within the lower lumbar spine with right convex scoliosis. 1. No acute bony abnormality. Signed by Dr Delonte Benedict    XR FEMUR LEFT (MIN 2 VIEWS)    Result Date: 8/19/2021  XR FEMUR LEFT (MIN 2 VIEWS) 8/19/2021 8:15 PM History: Leg pain. Left femur, 4 views. Well seated normally aligned left hip prosthesis. Intact mid and distal femur. Intact knee joint, to the extent visualized. There is moderate osteoarthritic spurring at the medial tibial femoral compartment. 1. No acute bony abnormality. Signed by Dr Delonte Benedict    XR HIP 2-3 VW W PELVIS LEFT    Result Date: 8/10/2021  EXAMINATION: XR HIP 2-3 VW W PELVIS LEFT 8/10/2021 3:44 PM HISTORY: XR HIP 2-3 VW W PELVIS LEFT 8/10/2021 2:30 PM HISTORY: Post total hip replacement Findings: Frontal view of the pelvis and frontal and lateral radiographs of the LEFT hip demonstrate a total hip arthroplasty in normal alignment. Soft tissue and intra-articular air are present. Impression: 1. Status post LEFT total hip arthroplasty without evidence of unexpected complication. Signed by Dr Jaimie Us      Assessment:   Her left hip x-rays look great. I think her hip is fine. This nondisplaced fracture with the need for toe-touch weightbearing has slowed her progress. She is a smaller lady. Her x-rays look good yesterday. I think I will go ahead and let her weight-bear as tolerated. 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 She has some degenerative scoliosis. Plan: Toradol and prednisone for her lower back pain. X-rays of her lumbar spine. She seems interested in going to rehab will put in a consult for rehab placement. Weight-bear as tolerated on her left hip. Provider:  Lina Bledsoe MD  Date: 8/20/2021

## 2021-08-20 NOTE — H&P
[facility]  History and Physical    Patient:  Papito Olmos  MRN: [id number]    CHIEF COMPLAINT: Left leg and hip pain      PCP: Justin Robledo DO    HISTORY OF PRESENT ILLNESS:   The patient is a 77 y.o. female who presented to the emergency room last night for worsening hip pain. Patient states she had a left hip replacement by Dr. Britta Sepulveda 10 days ago and supposedly was somewhat complicated because there was an associated fracture and told recovery could take some extra time. Patient has not fallen. She has been using pain meds at home and called ortho office yesterday and told ok to double the dose which she did twice but still not helping. She relates that she cant really walk at this point with her walker. Lives at home with daughter and . Wanted to go to rehab but couldn't due to insurance issue. She presented emergency room last night for persistent pain in inability to walk postoperatively. She has been admitted due to her continual decline at home. She will need inpatient rehab on eighth floor or SNF.  has been consulted. Past Medical History:      Diagnosis Date    Arthritis     Hyperlipidemia     Hypertension        Past Surgical History:      Procedure Laterality Date    APPENDECTOMY      HYSTERECTOMY      TOTAL HIP ARTHROPLASTY Left 8/10/2021    LEFT TOTAL HIP ARTHROPLASTY ANTERIOR APPROACH performed by Meme Copeland MD at [address] [place] [address]       Medications Prior to Admission:    Prior to Admission medications    Medication Sig Start Date End Date Taking?  Authorizing Provider   aspirin EC 81 MG EC tablet Take 1 tablet by mouth 2 times daily 8/13/21  Yes Meme Copeland MD   estrogens, conjugated, (PREMARIN) 0.3 MG tablet Take 0.3 mg by mouth daily   Yes Historical Provider, MD   verapamil (VERELAN) 240 MG extended release capsule Take 240 mg by mouth nightly   Yes Historical Provider, MD   simvastatin (ZOCOR) 20 MG tablet Take 20 mg by mouth nightly   Yes Historical Provider, MD   calcium carbonate (OSCAL) 500 MG TABS tablet Take 500 mg by mouth daily   Yes Historical Provider, MD   Magnesium Hydroxide (MAGNESIA PO) Take 80 mg by mouth daily   Yes Historical Provider, MD   meloxicam (MOBIC) 15 MG tablet Take 15 mg by mouth daily   Yes Historical Provider, MD   Biotin 10 MG CAPS Take 1 tablet by mouth daily    Historical Provider, MD   vitamin D3 (CHOLECALCIFEROL) 10 MCG (400 UNIT) TABS tablet Take 800 Units by mouth daily    Historical Provider, MD   Pyridoxine HCl (VITAMIN B6 PO) Take 10 mg by mouth daily    Historical Provider, MD   BORON PO Take 1 mg by mouth daily    Historical Provider, MD       Allergies:  Patient has no known allergies. Social History:   Social History     Socioeconomic History    Marital status:      Spouse name: Not on file    Number of children: Not on file    Years of education: Not on file    Highest education level: Not on file   Occupational History    Not on file   Tobacco Use    Smoking status: Never Smoker    Smokeless tobacco: Never Used   Substance and Sexual Activity    Alcohol use: Yes     Comment: occasional    Drug use: Not on file    Sexual activity: Not on file   Other Topics Concern    Not on file   Social History Narrative    Not on file     Social Determinants of Health     Financial Resource Strain:     Difficulty of Paying Living Expenses:    Food Insecurity:     Worried About Running Out of Food in the Last Year:     [address] in the Last Year:    Transportation Needs:     Lack of Transportation (Medical):      Lack of Transportation (Non-Medical):    Physical Activity:     Days of Exercise per Week:     Minutes of Exercise per Session:    Stress:     Feeling of Stress :    Social Connections:     Frequency of Communication with Friends and Family:     Frequency of Social Gatherings with Friends and Family:     Attends Denominational Services:     Active Member of Clubs or Organizations:     Attends Club or Organization Meetings:     Marital Status:    Intimate Partner Violence:     Fear of Current or Ex-Partner:     Emotionally Abused:     Physically Abused:     Sexually Abused:        Family History:   History reviewed. No pertinent family history. Review of systems:  Constitutional: Positive for fatigue. Negative for chills and fever. Respiratory: Negative for shortness of breath. Cardiovascular: Negative for chest pain. Gastrointestinal: Negative for abdominal pain, diarrhea, nausea and vomiting. Genitourinary: Negative for dysuria, flank pain and frequency. Musculoskeletal: Positive for gait problem. Negative for back pain. Skin: Negative for rash. Neurological: Negative for dizziness and headaches. A full 14 point review of systems is otherwise negative outside listed above and HPI      Physical Exam:    Vitals: BP (!) 150/62   Pulse 82   Temp 98.3 °F (36.8 °C) (Temporal)   Resp 18   Ht 5' 3\" (1.6 m)   Wt 138 lb (62.6 kg)   SpO2 99%   BMI 24.45 kg/m²     GENERAL: WD/WN not in acute distress, resting well in bed  HEENT: NC/AT PERRL, EOMI grossly, TM's clear, OP negative without sig erythema or exudate, neck is supple without masses or carotid bruit noted    CV: normal S1 and S2, no sig murmur, lift or gallop, normal PMI  CHEST: clear to auscultation both anterior and posterior, no rales rhonchi or wheeze appreciated. ABD: soft NT/ND with normoactive BS noted.  No guarding or rebounding noted  /rectal: deferred  EXT: no cyanosis or clubbing noted, normal ROM  DERM: skin is warm and dry without sig rashes on exposed areas  PSYCH: appears mentally stable with no obvious abnormalities  NEURO: CN 2-12 apper grossly intact without sig deficits in motor or sensory       CBC:   Recent Labs     08/19/21 1923   WBC 8.8   HGB 8.5*   *     BMP:    Recent Labs     08/19/21 1923   *   K 4.0      CO2 25   BUN 11   CREATININE 0.6 GLUCOSE 112*     Hepatic: No results for input(s): AST, ALT, ALB, BILITOT, ALKPHOS in the last 72 hours. Troponin T: No results for input(s): TROPONINI in the last 72 hours. Pro-BNP: No results for input(s): BNP in the last 72 hours. Lipids: No results for input(s): CHOL, HDL in the last 72 hours. Invalid input(s): LDLCALCU  ABGs: No results found for: PHART, PO2ART, RDM9GCF  INR: No results for input(s): INR in the last 72 hours. -----------------------------------------------------------------  EKG:   Radiology:     XR PELVIS (1-2 VIEWS)    Result Date: 8/19/2021  XR PELVIS (1-2 VIEWS) 8/19/2021 8:17 PM History: Pelvis and left hip pain. Pelvis, one view. Appearance of the left hip prosthesis. Intact right hip. Intact pelvic ring. SI joints are symmetric. Moderate degenerative change within the lower lumbar spine with right convex scoliosis. 1. No acute bony abnormality. Signed by Dr Dory Lara    XR FEMUR LEFT (MIN 2 VIEWS)    Result Date: 8/19/2021  XR FEMUR LEFT (MIN 2 VIEWS) 8/19/2021 8:15 PM History: Leg pain. Left femur, 4 views. Well seated normally aligned left hip prosthesis. Intact mid and distal femur. Intact knee joint, to the extent visualized. There is moderate osteoarthritic spurring at the medial tibial femoral compartment. 1. No acute bony abnormality. Signed by Dr Zhao Cash and Plan     Active Problems:    Post-operative pain Primary osteoarthritis of left hip     Gait instability--PT/OT, rehab placement    Acute postoperative blood loss anemia--stable, expected, being monitored with daily labs. Anemia profile ordered, replace substrates as indicated. Hemoglobin stable 8.5     Slow transit constipation--continue with bowel regimen     Essential hypertension--stable, continue with [place]     Encourage incentive spirometry every hour while awake  PT/OT consultation  Social service consultation for placement.   Continue course of care monitor for change  Notify Dr. Sarabia Done of admission    Oscar Holland PA-C  .  8/20/2021     Unstable gait/postop pain-left hip looks good, suspect most of her pain is coming from her degenerative disc disease in her lumbar sacral spine. Will use anti-inflammatories/muscle relaxer/physical therapy. I think patient would be best suited in a rehab facility. We will consult  for placement. Medically stable for discharge to skilled nursing/rehab over the weekend if pain controlled and bed available. I have discussed the care of Ulysses Lass, including pertinent history and exam findings with the ARNP/PA. I have seen and examined the patient and the key elements of all parts of the encounter have been performed by me. I agree with the assessment and plan as outlined by the ARNP/PA. Please refer to my separate note for complete documentation.      Electronically signed by George Delvalle MD on 8/20/2021 at 8:51 AM

## 2021-08-20 NOTE — CARE COORDINATION
When entering room pt was in the restroom. SW stood at the door until she ambulated outside of the bathroom. Pt then requested this SW get her toothbrush and toothpaste. Provided pt with this, pt started brushing teeth, PCA entered room. No family at bedside. SW informed pt that she saw where she was interested in placement. Pt denied this, stated she was going home, and did not want any referrals to be sent. Pt stated she is waiting to receive news about an X-ray, but if it comes back normal she will be going home soon.

## 2021-08-20 NOTE — ED NOTES
Attempted to contact pts  x2 for discharge. No answer. Unable to leave voicemail.      Dawood Toribio, RN  08/19/21 2006

## 2021-08-20 NOTE — PROGRESS NOTES
Physical Therapy    Facility/Department: [facility] SUR[facility] SERVICES  Initial Assessment    NAME: Pao Watkins  : 1945  MRN: [id number]    Date of Service: 2021    Discharge Recommendations:  Continue to assess pending progress, 24 hour supervision or assist        Assessment   Body structures, Functions, Activity limitations: Decreased functional mobility ; Decreased ADL status; Decreased balance; Increased pain  Assessment: Pt NOW ABLE TO BE WBAT AFTER RECENT L THR ON 8/10. ABLE TO AMB SLOWLY IN ROOM USING RW. WILL CONT TO MONITOR BUT Pt LIKELY SAFE TO RETURN HOME WITH SUPERVISION. PT Education: Plan of Care;PT Role;Precautions  REQUIRES PT FOLLOW UP: Yes  Activity Tolerance  Activity Tolerance: Patient Tolerated treatment well       Patient Diagnosis(es): The primary encounter diagnosis was Post-operative pain. A diagnosis of Left hip pain was also pertinent to this visit. has a past medical history of Arthritis, Hyperlipidemia, and Hypertension. has a past surgical history that includes Hysterectomy; Appendectomy; and Total hip arthroplasty (Left, 8/10/2021). Restrictions  Restrictions/Precautions  Restrictions/Precautions: Fall Risk, Weight Bearing  Lower Extremity Weight Bearing Restrictions  Left Lower Extremity Weight Bearing: Weight Bearing As Tolerated  Vision/Hearing  Vision: Within Functional Limits  Hearing: Within functional limits     Subjective  General  Diagnosis: BACK PAIN, recent L THR 8/10  Subjective  Subjective: Pt WILLING TO PARTICIPATE. Pain Screening  Patient Currently in Pain: Yes  Pain Assessment  Pain Assessment: 0-10  Pain Level: 7  Pain Type: Acute pain  Pain Location: Back  Pain Orientation: Left; Lower  Pain Descriptors: Aching  Pain Frequency: Continuous  Pain Onset: On-going  Functional Pain Assessment: Activities are not prevented  Non-Pharmaceutical Pain Intervention(s): Ambulation/Increased Activity;Cold applied;Repositioned; Rest  Response to Pain Intervention: Patient Satisfied  Vital Signs  Patient Currently in Pain: Yes       Orientation  Orientation  Overall Orientation Status: Within Normal Limits  Social/Functional History  Social/Functional History  Lives With: Spouse  Type of Home: House  Home Layout: One level  Ambulation Assistance: Independent (MINIMAL DUE TO TTWB PREVIOUSLY, USING RW)  Transfer Assistance: Independent  Cognition   Cognition  Overall Cognitive Status: WNL    Objective          AROM RLE (degrees)  RLE AROM: WFL  AROM LLE (degrees)  LLE General AROM: HIP DEC DUE TO DISCOMFORT  Strength RLE  Strength RLE: WFL  Strength LLE  L Hip Flexion: 3-/5  L Knee Extension: 4/5  L Ankle Dorsiflexion: 4+/5        Bed mobility  Supine to Sit: Modified independent  Sit to Supine: Modified independent  Transfers  Sit to Stand: Stand by assistance;Contact guard assistance  Stand to sit: Stand by assistance;Contact guard assistance  Bed to Chair: Stand by assistance;Contact guard assistance  Ambulation  WB Status: WBAT  Ambulation 1  Device: Rolling Walker  Assistance: Stand by assistance;Contact guard assistance  Quality of Gait: STEP-TO PATTERN, GOOD WEIGHTBEARING  Gait Deviations: Slow Елена;Decreased step length  Distance: 10' + 20'     Balance  Sitting - Dynamic: Good  Standing - Dynamic: Fair;+        Plan   Plan  Times per week: AT LEAST 6-7  Current Treatment Recommendations: Strengthening, ROM, Functional Mobility Training, Transfer Training, Balance Training, Gait Training, Safety Education & Training, Patient/Caregiver Education & Training  Safety Devices  Type of devices: Bed alarm in place, Call light within reach    G-Code       OutComes Score                                                  AM-PAC Score             Goals  Short term goals  Time Frame for Short term goals: 14 DAYS  Short term goal 1: TRANSFERS MOD IND  Short term goal 2: ' RW SUP-IND       Therapy Time   Individual Concurrent Group Co-treatment   Time In           Time Out Minutes                   Suad Murguia, PT

## 2021-08-21 VITALS
HEIGHT: 63 IN | BODY MASS INDEX: 24.45 KG/M2 | TEMPERATURE: 97.4 F | SYSTOLIC BLOOD PRESSURE: 133 MMHG | RESPIRATION RATE: 18 BRPM | HEART RATE: 79 BPM | WEIGHT: 138 LBS | DIASTOLIC BLOOD PRESSURE: 62 MMHG | OXYGEN SATURATION: 98 %

## 2021-08-21 PROBLEM — M54.16 LUMBAR RADICULITIS: Status: ACTIVE | Noted: 2021-08-21

## 2021-08-21 LAB
IRON SATURATION: 9 % (ref 14–50)
IRON: 20 UG/DL (ref 37–145)
TOTAL IRON BINDING CAPACITY: 224 UG/DL (ref 250–400)
VITAMIN B-12: 583 PG/ML (ref 211–946)

## 2021-08-21 PROCEDURE — 6370000000 HC RX 637 (ALT 250 FOR IP): Performed by: ORTHOPAEDIC SURGERY

## 2021-08-21 PROCEDURE — 83540 ASSAY OF IRON: CPT

## 2021-08-21 PROCEDURE — 6370000000 HC RX 637 (ALT 250 FOR IP): Performed by: FAMILY MEDICINE

## 2021-08-21 PROCEDURE — 6360000002 HC RX W HCPCS: Performed by: ORTHOPAEDIC SURGERY

## 2021-08-21 PROCEDURE — 83550 IRON BINDING TEST: CPT

## 2021-08-21 PROCEDURE — 82607 VITAMIN B-12: CPT

## 2021-08-21 PROCEDURE — 97116 GAIT TRAINING THERAPY: CPT

## 2021-08-21 PROCEDURE — G0378 HOSPITAL OBSERVATION PER HR: HCPCS

## 2021-08-21 PROCEDURE — 36415 COLL VENOUS BLD VENIPUNCTURE: CPT

## 2021-08-21 RX ORDER — PREDNISONE 20 MG/1
40 TABLET ORAL DAILY
Qty: 8 TABLET | Refills: 0 | Status: SHIPPED | OUTPATIENT
Start: 2021-08-21 | End: 2021-08-25

## 2021-08-21 RX ADMIN — METHYLPREDNISOLONE 4 MG: 4 TABLET ORAL at 14:58

## 2021-08-21 RX ADMIN — OXYCODONE HYDROCHLORIDE AND ACETAMINOPHEN 1 TABLET: 5; 325 TABLET ORAL at 11:09

## 2021-08-21 RX ADMIN — MELOXICAM 15 MG: 7.5 TABLET ORAL at 09:40

## 2021-08-21 RX ADMIN — METHYLPREDNISOLONE 4 MG: 4 TABLET ORAL at 09:40

## 2021-08-21 RX ADMIN — ASPIRIN 81 MG: 81 TABLET, COATED ORAL at 09:40

## 2021-08-21 RX ADMIN — Medication 1 TABLET: at 09:40

## 2021-08-21 ASSESSMENT — PAIN SCALES - GENERAL
PAINLEVEL_OUTOF10: 4
PAINLEVEL_OUTOF10: 5
PAINLEVEL_OUTOF10: 2

## 2021-08-21 ASSESSMENT — PAIN DESCRIPTION - PAIN TYPE: TYPE: ACUTE PAIN

## 2021-08-21 ASSESSMENT — PAIN DESCRIPTION - LOCATION: LOCATION: BACK;HIP

## 2021-08-21 ASSESSMENT — PAIN DESCRIPTION - DESCRIPTORS: DESCRIPTORS: ACHING

## 2021-08-21 ASSESSMENT — PAIN - FUNCTIONAL ASSESSMENT: PAIN_FUNCTIONAL_ASSESSMENT: ACTIVITIES ARE NOT PREVENTED

## 2021-08-21 NOTE — PROGRESS NOTES
Physical Therapy  Name: Melida Adrian  MRN:  [id number]  Date of service:  8/21/2021 08/21/21 0826   Restrictions/Precautions   Restrictions/Precautions Fall Risk;Weight Bearing   Lower Extremity Weight Bearing Restrictions   Left Lower Extremity Weight Bearing Weight Bearing As Tolerated   General   Chart Reviewed Yes   Subjective   Subjective Pt in bed, agrees to work with therapy. Pain Screening   Patient Currently in Pain Yes   Pain Assessment   Pain Assessment 0-10   Pain Level 4   Pain Type Acute pain   Pain Location Back; Hip   Pain Descriptors Aching   Functional Pain Assessment Activities are not prevented   Non-Pharmaceutical Pain Intervention(s) Ambulation/Increased Activity;Cold applied;Repositioned; Rest   Bed Mobility   Supine to Sit Stand by assistance   Transfers   Sit to Stand Stand by assistance   Stand to sit Stand by assistance   Ambulation   Ambulation? Yes   WB Status WBAT   Ambulation 1   Surface level tile   Device Rolling Walker   Assistance Stand by assistance   Quality of Gait STEP-TO PATTERN, GOOD WEIGHTBEARING   Gait Deviations Slow Елена;Decreased step length   Distance 50'   Short term goals   Time Frame for Short term goals 14 DAYS   Short term goal 1 TRANSFERS MOD IND   Short term goal 2 ' RW SUP-IND   Conditions Requiring Skilled Therapeutic Intervention   Body structures, Functions, Activity limitations Decreased functional mobility ; Decreased ADL status; Decreased balance; Increased pain   Assessment Pt able to increase amb distance into hallway, adequate WB thru BLE and minimal increased pain. Pt up to recliner and positioned for comfort with all needs in reach.    Activity Tolerance   Activity Tolerance Patient Tolerated treatment well   Safety Devices   Type of devices Call light within reach;Gait belt;Left in chair         Electronically signed by Kehinde Garrido PTA on 8/21/2021 at 9:28 AM

## 2021-08-21 NOTE — DISCHARGE SUMMARY
Discharge Summary    Patient ID: Ki Mckee  MRN: [id number]     Acct:  [de-identified]       Patient's PCP: Kathleen Bright DO    Admit Date: 8/19/2021     Discharge Date:   8/21/2021    Admitting Physician: Bianca Morris MD    Discharge Physician: Carleen Melissa MD     Active Discharge Diagnoses:    Primary Problem  Lumbar radiculitis  [place] Problems    Diagnosis Date Noted    Lumbar radiculitis [M54.16] 08/21/2021    Essential hypertension [I10] 08/20/2021    Unstable gait [R26.81] 08/20/2021    Postoperative anemia [D64.9] 08/20/2021    Post-operative pain [G89.18] 08/19/2021    Primary osteoarthritis of left hip [M16.12] 08/10/2021       The patient was seen and examined on day of discharge and this discharge summary is in conjunction with the daily progress note from day of discharge. Code Status:  Full Code    Hospital Course: 50-year-old female who underwent recent left total hip arthroplasty on August 10 and developed worsening postoperative pain but she localized the pain more to her back. Imaging of her hip was unremarkable and she was evaluated by orthopedic surgery. Pain felt to be secondary to significant arthritis seen on lumbar x-rays. She was treated for lumbar radiculitis with Toradol and steroids with significant provement symptoms. She was pain-free on day of discharge and requesting discharge home. We will discharge her home with 4 more days of prednisone burst dose and follow-up closely with Dr. Fox Mireles. The patient was admitted for the above noted medical/surgical issues. Please see daily progress note for further details concerning their stay. The patient improved throughout their stay and reached maximum medical improvement on the day of discharge. The patient/family agree with the treatment plan as outlined above. All questions concerning their stay were answered prior to discharge.  They understand the importance of follow up concerning any abnormal test results. Physical Exam  Constitutional:       Appearance: Normal appearance. HENT:      Head: Normocephalic and atraumatic. Nose: Nose normal.      Mouth/Throat:      Mouth: Mucous membranes are moist.   Eyes:      Extraocular Movements: Extraocular movements intact. Pupils: Pupils are equal, round, and reactive to light. Cardiovascular:      Rate and Rhythm: Normal rate and regular rhythm. Pulmonary:      Effort: Pulmonary effort is normal.      Breath sounds: Normal breath sounds. Abdominal:      General: Abdomen is flat. Palpations: Abdomen is soft. Skin:     General: Skin is warm and dry. Capillary Refill: Capillary refill takes less than 2 seconds. Neurological:      General: No focal deficit present. Mental Status: She is alert and oriented to person, place, and time. Psychiatric:         Mood and Affect: Mood normal.         Behavior: Behavior normal.        Consults:  IP CONSULT TO ORTHOPEDIC SURGERY  IP CONSULT TO SOCIAL WORK    Disposition: home     Discharged Condition: stable    Follow Up: Isabel Ca MD  [address] [place] [address]  [phone]    Call in 1 day      Fabiano Fleischer, [address] [phone]            Lab Frequency Next Occurrence   Up with assistance PRN        Diet: ADULT DIET;  Regular    Discharge Medications:      Medication List      START taking these medications    predniSONE 20 MG tablet  Commonly known as: DELTASONE  Take 2 tablets by mouth daily for 4 days        CONTINUE taking these medications    aspirin EC 81 MG EC tablet  Take 1 tablet by mouth 2 times daily     Biotin 10 MG Caps     BORON PO     calcium carbonate 500 MG Tabs tablet  Commonly known as: OSCAL     estrogens (conjugated) 0.3 MG tablet  Commonly known as: PREMARIN     MAGNESIA PO     meloxicam 15 MG tablet  Commonly known as: MOBIC     simvastatin 20 MG tablet  Commonly known as: ZOCOR     verapamil 240 MG extended release capsule  Commonly known as: VERELAN     VITAMIN B6 PO     vitamin D3 10 MCG (400 UNIT) Tabs tablet  Commonly known as: CHOLECALCIFEROL           Where to Get Your Medications      These medications were sent to [facility], [address] [place] [address]    Phone: [phone]   · predniSONE 20 MG tablet         Time Spent on discharge is  25 minutes in patient examination, evaluation, counseling as well as medication reconciliation, prescriptions for required medications, discharge planning and follow up.     Electronically signed by Anayeli Reeves MD on 8/21/2021 at 12:38 PM

## (undated) DEVICE — SYSTEM SKIN CLSR 22CM DERMBND PRINEO

## (undated) DEVICE — TRAY EPI 25GA L3.5IN 0.75% BIPIVCAIN 8.25% D CONTAIN BPA

## (undated) DEVICE — PACK ANT HIP CDS

## (undated) DEVICE — GLOVE SURG SZ 85 L12IN FNGR THK94MIL TRNSLUC YEL LTX

## (undated) DEVICE — LIGHT SUCT UNTETHERED SCINTILLANT

## (undated) DEVICE — SUTURE VCRL SZ 0 L27IN ABSRB UD L36MM CT-1 1/2 CIR J260H

## (undated) DEVICE — GLOVE SURG SZ 85 CRM LTX FREE POLYISOPRENE POLYMER BEAD ANTI

## (undated) DEVICE — GLOVE SURG SZ 85 L12IN FNGR THK79MIL GRN LTX FREE

## (undated) DEVICE — CHLORAPREP 26ML ORANGE

## (undated) DEVICE — SUTURE VCRL SZ 2-0 L36IN ABSRB UD L36MM CT-1 1/2 CIR J945H

## (undated) DEVICE — 3M™ STERI-DRAPE™ INSTRUMENT POUCH 1018: Brand: STERI-DRAPE™

## (undated) DEVICE — SOLUTION IV IRRIG POUR BRL 0.9% SODIUM CHL 2F7124

## (undated) DEVICE — Z INACTIVE USE 2660664 SOLUTION IRRIG 3000ML 0.9% SOD CHL USP UROMATIC PLAS CONT

## (undated) DEVICE — BAG BND W36XL36IN TRNSPAR POLY GEN PURP W E BND CLSR TIDI

## (undated) DEVICE — SUTURE VCRL SZ 3-0 L27IN ABSRB UD L19MM PS-2 3/8 CIR PRIM J427H

## (undated) DEVICE — COVER POS PERINL POST NS 082501

## (undated) DEVICE — DUAL CUT SAGITTAL BLADE

## (undated) DEVICE — SUTURE PERMAHAND SZ 0 L30IN NONABSORBABLE BLK SILK BRAID A306H

## (undated) DEVICE — DRESSING FOAM SELF ADH 20X10 CM ABSORBENT MEPILEX BORDER

## (undated) DEVICE — GLOVE SURG SZ 85 L12IN FNGR ORTHO 126MIL CRM LTX FREE